# Patient Record
Sex: MALE | Race: WHITE | NOT HISPANIC OR LATINO | URBAN - METROPOLITAN AREA
[De-identification: names, ages, dates, MRNs, and addresses within clinical notes are randomized per-mention and may not be internally consistent; named-entity substitution may affect disease eponyms.]

---

## 2018-06-25 ENCOUNTER — EMERGENCY (EMERGENCY)
Facility: HOSPITAL | Age: 33
LOS: 1 days | Discharge: HOME | End: 2018-06-25
Attending: EMERGENCY MEDICINE
Payer: MEDICARE

## 2018-06-25 VITALS
WEIGHT: 223.11 LBS | HEIGHT: 70 IN | HEART RATE: 73 BPM | DIASTOLIC BLOOD PRESSURE: 78 MMHG | SYSTOLIC BLOOD PRESSURE: 134 MMHG | TEMPERATURE: 99 F | RESPIRATION RATE: 16 BRPM | OXYGEN SATURATION: 96 %

## 2018-06-25 VITALS
RESPIRATION RATE: 17 BRPM | DIASTOLIC BLOOD PRESSURE: 70 MMHG | OXYGEN SATURATION: 100 % | TEMPERATURE: 98 F | SYSTOLIC BLOOD PRESSURE: 122 MMHG | HEART RATE: 75 BPM

## 2018-06-25 PROCEDURE — 72100 X-RAY EXAM L-S SPINE 2/3 VWS: CPT | Mod: 26

## 2018-06-25 PROCEDURE — 72100 X-RAY EXAM L-S SPINE 2/3 VWS: CPT

## 2018-06-25 PROCEDURE — 99285 EMERGENCY DEPT VISIT HI MDM: CPT

## 2018-06-25 PROCEDURE — 99283 EMERGENCY DEPT VISIT LOW MDM: CPT | Mod: 25

## 2018-06-25 RX ORDER — IBUPROFEN 200 MG
1 TABLET ORAL
Qty: 20 | Refills: 0
Start: 2018-06-25 | End: 2018-06-29

## 2018-06-25 RX ORDER — IBUPROFEN 200 MG
600 TABLET ORAL ONCE
Qty: 0 | Refills: 0 | Status: COMPLETED | OUTPATIENT
Start: 2018-06-25 | End: 2018-06-25

## 2018-06-25 RX ORDER — LIDOCAINE 4 G/100G
1 CREAM TOPICAL ONCE
Qty: 0 | Refills: 0 | Status: COMPLETED | OUTPATIENT
Start: 2018-06-25 | End: 2018-06-25

## 2018-06-25 RX ORDER — CYCLOBENZAPRINE HYDROCHLORIDE 10 MG/1
10 TABLET, FILM COATED ORAL ONCE
Qty: 0 | Refills: 0 | Status: COMPLETED | OUTPATIENT
Start: 2018-06-25 | End: 2018-06-25

## 2018-06-25 RX ORDER — CYCLOBENZAPRINE HYDROCHLORIDE 10 MG/1
1 TABLET, FILM COATED ORAL
Qty: 9 | Refills: 0
Start: 2018-06-25 | End: 2018-06-27

## 2018-06-25 RX ADMIN — LIDOCAINE 1 PATCH: 4 CREAM TOPICAL at 20:44

## 2018-06-25 RX ADMIN — CYCLOBENZAPRINE HYDROCHLORIDE 10 MILLIGRAM(S): 10 TABLET, FILM COATED ORAL at 20:44

## 2018-06-25 RX ADMIN — Medication 600 MILLIGRAM(S): at 20:44

## 2018-06-25 NOTE — ED ADULT NURSE NOTE - OBJECTIVE STATEMENT
Present to ER with c/o of generalized back pain. Pt states it started at work. Denies any trauma or LOC.

## 2018-06-25 NOTE — ED PROVIDER NOTE - OBJECTIVE STATEMENT
33 y male presents with lower back pain, after twisting at home earlier this afternoon,  denies blunt trauma, denies fall,  did not take any pain medication, states he has hx of herniated discs,  had MRI done over 1 year ago.  no change in bowel and bladder habits,  PMD Dr Dykes  + smoker, no orthopedic

## 2018-06-25 NOTE — ED PROVIDER NOTE - PROGRESS NOTE DETAILS
patient resting comfortably, ls spine xray no acute abnormality, rx for flexeril and motrin sent to pharmacy,  given information for ortho Dr Pearce,  advised otc lidoderm patch , back pain patch as directed, any concerns or if condition worsens to return to ed

## 2018-06-25 NOTE — ED PROVIDER NOTE - ATTENDING CONTRIBUTION TO CARE
pt with acute on chronic back after twisting.  xray no acute fracture.  pain control, muscle relaxer, ortho f/u.  neurovascularly intact

## 2018-06-25 NOTE — ED PROVIDER NOTE - CHPI ED SYMPTOMS NEG
no bladder dysfunction/no neck tenderness/no difficulty bearing weight/no bowel dysfunction/no constipation/no numbness/no tingling/no motor function loss

## 2018-06-25 NOTE — ED ADULT TRIAGE NOTE - CHIEF COMPLAINT QUOTE
c/o lower back pain which started while at work, denies any fall, or any radiation c/o lower back pain which started while at work, denies any fall, or any radiation of pain down the legs, patient denies any numbness, weakness down the legs,

## 2019-09-25 ENCOUNTER — INPATIENT (INPATIENT)
Facility: HOSPITAL | Age: 34
LOS: 6 days | Discharge: HOME | DRG: 885 | End: 2019-10-02
Attending: PSYCHIATRY & NEUROLOGY | Admitting: PSYCHIATRY & NEUROLOGY
Payer: MEDICARE

## 2019-09-25 VITALS
DIASTOLIC BLOOD PRESSURE: 83 MMHG | WEIGHT: 220.02 LBS | SYSTOLIC BLOOD PRESSURE: 131 MMHG | RESPIRATION RATE: 16 BRPM | HEIGHT: 71 IN | OXYGEN SATURATION: 99 % | TEMPERATURE: 99 F | HEART RATE: 115 BPM

## 2019-09-25 LAB
ALBUMIN SERPL ELPH-MCNC: 3.7 G/DL — SIGNIFICANT CHANGE UP (ref 3.3–5)
ALP SERPL-CCNC: 76 U/L — SIGNIFICANT CHANGE UP (ref 30–120)
ALT FLD-CCNC: 27 U/L DA — SIGNIFICANT CHANGE UP (ref 10–60)
AMPHET UR-MCNC: NEGATIVE — SIGNIFICANT CHANGE UP
ANION GAP SERPL CALC-SCNC: 11 MMOL/L — SIGNIFICANT CHANGE UP (ref 5–17)
ANISOCYTOSIS BLD QL: SLIGHT — SIGNIFICANT CHANGE UP
APAP SERPL-MCNC: <1 UG/ML — LOW (ref 10–30)
APPEARANCE UR: CLEAR — SIGNIFICANT CHANGE UP
AST SERPL-CCNC: 25 U/L — SIGNIFICANT CHANGE UP (ref 10–40)
BACTERIA # UR AUTO: ABNORMAL
BARBITURATES UR SCN-MCNC: NEGATIVE — SIGNIFICANT CHANGE UP
BASOPHILS # BLD AUTO: 0.08 K/UL — SIGNIFICANT CHANGE UP (ref 0–0.2)
BASOPHILS NFR BLD AUTO: 0.8 % — SIGNIFICANT CHANGE UP (ref 0–2)
BENZODIAZ UR-MCNC: NEGATIVE — SIGNIFICANT CHANGE UP
BILIRUB SERPL-MCNC: 0.3 MG/DL — SIGNIFICANT CHANGE UP (ref 0.2–1.2)
BILIRUB UR-MCNC: NEGATIVE — SIGNIFICANT CHANGE UP
BUN SERPL-MCNC: 15 MG/DL — SIGNIFICANT CHANGE UP (ref 7–23)
CALCIUM SERPL-MCNC: 9.1 MG/DL — SIGNIFICANT CHANGE UP (ref 8.4–10.5)
CHLORIDE SERPL-SCNC: 103 MMOL/L — SIGNIFICANT CHANGE UP (ref 96–108)
CO2 SERPL-SCNC: 26 MMOL/L — SIGNIFICANT CHANGE UP (ref 22–31)
COCAINE METAB.OTHER UR-MCNC: NEGATIVE — SIGNIFICANT CHANGE UP
COLOR SPEC: YELLOW — SIGNIFICANT CHANGE UP
CREAT SERPL-MCNC: 1.28 MG/DL — SIGNIFICANT CHANGE UP (ref 0.5–1.3)
DIFF PNL FLD: NEGATIVE — SIGNIFICANT CHANGE UP
ELLIPTOCYTES BLD QL SMEAR: SLIGHT — SIGNIFICANT CHANGE UP
EOSINOPHIL # BLD AUTO: 0.34 K/UL — SIGNIFICANT CHANGE UP (ref 0–0.5)
EOSINOPHIL NFR BLD AUTO: 3.4 % — SIGNIFICANT CHANGE UP (ref 0–6)
EPI CELLS # UR: NEGATIVE — SIGNIFICANT CHANGE UP
ETHANOL SERPL-MCNC: <3 MG/DL — SIGNIFICANT CHANGE UP (ref 0–3)
GLUCOSE SERPL-MCNC: 100 MG/DL — HIGH (ref 70–99)
GLUCOSE UR QL: NEGATIVE MG/DL — SIGNIFICANT CHANGE UP
HCT VFR BLD CALC: 33 % — LOW (ref 39–50)
HGB BLD-MCNC: 9.3 G/DL — LOW (ref 13–17)
HYPOCHROMIA BLD QL: SLIGHT — SIGNIFICANT CHANGE UP
IMM GRANULOCYTES NFR BLD AUTO: 0.2 % — SIGNIFICANT CHANGE UP (ref 0–1.5)
KETONES UR-MCNC: ABNORMAL
LEUKOCYTE ESTERASE UR-ACNC: NEGATIVE — SIGNIFICANT CHANGE UP
LYMPHOCYTES # BLD AUTO: 2.57 K/UL — SIGNIFICANT CHANGE UP (ref 1–3.3)
LYMPHOCYTES # BLD AUTO: 25.6 % — SIGNIFICANT CHANGE UP (ref 13–44)
MANUAL SMEAR VERIFICATION: SIGNIFICANT CHANGE UP
MCHC RBC-ENTMCNC: 19.5 PG — LOW (ref 27–34)
MCHC RBC-ENTMCNC: 28.2 GM/DL — LOW (ref 32–36)
MCV RBC AUTO: 69.2 FL — LOW (ref 80–100)
METHADONE UR-MCNC: NEGATIVE — SIGNIFICANT CHANGE UP
MICROCYTES BLD QL: SLIGHT — SIGNIFICANT CHANGE UP
MONOCYTES # BLD AUTO: 0.71 K/UL — SIGNIFICANT CHANGE UP (ref 0–0.9)
MONOCYTES NFR BLD AUTO: 7.1 % — SIGNIFICANT CHANGE UP (ref 2–14)
NEUTROPHILS # BLD AUTO: 6.3 K/UL — SIGNIFICANT CHANGE UP (ref 1.8–7.4)
NEUTROPHILS NFR BLD AUTO: 62.9 % — SIGNIFICANT CHANGE UP (ref 43–77)
NITRITE UR-MCNC: NEGATIVE — SIGNIFICANT CHANGE UP
NRBC # BLD: 0 /100 WBCS — SIGNIFICANT CHANGE UP (ref 0–0)
OPIATES UR-MCNC: NEGATIVE — SIGNIFICANT CHANGE UP
PCP SPEC-MCNC: SIGNIFICANT CHANGE UP
PCP UR-MCNC: NEGATIVE — SIGNIFICANT CHANGE UP
PH UR: 5 — SIGNIFICANT CHANGE UP (ref 5–8)
PLAT MORPH BLD: NORMAL — SIGNIFICANT CHANGE UP
PLATELET # BLD AUTO: 289 K/UL — SIGNIFICANT CHANGE UP (ref 150–400)
PLATELET COUNT - ESTIMATE: NORMAL — SIGNIFICANT CHANGE UP
POIKILOCYTOSIS BLD QL AUTO: SLIGHT — SIGNIFICANT CHANGE UP
POTASSIUM SERPL-MCNC: 3.4 MMOL/L — LOW (ref 3.5–5.3)
POTASSIUM SERPL-SCNC: 3.4 MMOL/L — LOW (ref 3.5–5.3)
PROT SERPL-MCNC: 7.3 G/DL — SIGNIFICANT CHANGE UP (ref 6–8.3)
PROT UR-MCNC: 15 MG/DL
RBC # BLD: 4.77 M/UL — SIGNIFICANT CHANGE UP (ref 4.2–5.8)
RBC # FLD: 18.5 % — HIGH (ref 10.3–14.5)
RBC BLD AUTO: ABNORMAL
RBC CASTS # UR COMP ASSIST: SIGNIFICANT CHANGE UP /HPF (ref 0–4)
SALICYLATES SERPL-MCNC: 2.2 MG/DL — LOW (ref 2.8–20)
SODIUM SERPL-SCNC: 140 MMOL/L — SIGNIFICANT CHANGE UP (ref 135–145)
SP GR SPEC: 1.02 — SIGNIFICANT CHANGE UP (ref 1.01–1.02)
THC UR QL: POSITIVE
UROBILINOGEN FLD QL: 1 MG/DL
WBC # BLD: 10.02 K/UL — SIGNIFICANT CHANGE UP (ref 3.8–10.5)
WBC # FLD AUTO: 10.02 K/UL — SIGNIFICANT CHANGE UP (ref 3.8–10.5)
WBC UR QL: SIGNIFICANT CHANGE UP

## 2019-09-25 PROCEDURE — 93010 ELECTROCARDIOGRAM REPORT: CPT

## 2019-09-25 RX ADMIN — Medication 2 MILLIGRAM(S): at 22:36

## 2019-09-25 NOTE — ED PROVIDER NOTE - OBJECTIVE STATEMENT
35 y/o male with a PMHx of schizophrenia presents to the ED for psych evaluation. Pt has been hearing voices intermittently since he was a child but states it has been worsening in the past 2 months. On Abilify but states it hasn't been working much anymore. Saw his doctor and was rx'd new medication but didn't like it because it made his legs feel heavy. Denies HI, SI. Smoked some marijuana today. no EtOH use. 35 y/o male with a PMHx of schizophrenia presents to the ED for psych evaluation. Pt has been hearing voices intermittently since he was a child but states it has been worsening in the past 2 months. On Abilify but states it hasn't been working much anymore. Saw his doctor and was rx'd new medication but didn't like it because it made his legs feel heavy. Denies HI, SI. Smoked some marijuana today. no EtOH use.   states has had intermittent SI but none today.

## 2019-09-25 NOTE — ED PROVIDER NOTE - PROGRESS NOTE DETAILS
results noted.   case d/w telepsych (Dr Rodarte) and will evaluate pt repeat CBC noted.   on further questioning, pt now states has had dark stool everyday x 1 yr but never f/u.   pt denies any dizziness, sob or weakness.   guiaic done and sent to lab.  pending results.   brown stool noted. repeat CBCs stable.   pt evaluated by Dr Rodarte and recommend admit for psychosis.

## 2019-09-25 NOTE — ED ADULT TRIAGE NOTE - CHIEF COMPLAINT QUOTE
I am schizophrenia and I have been hearing voices on and off for two months so I think I should be admitted to the psych ro[facility].

## 2019-09-25 NOTE — ED ADULT NURSE NOTE - CHIEF COMPLAINT QUOTE
I am schizophrenia and I have been hearing voices on and off for two months so I think I should be admitted to the psych ro[facility].

## 2019-09-25 NOTE — ED PROVIDER NOTE - CARE PLAN
Principal Discharge DX:	Schizoaffective disorder, unspecified type  Secondary Diagnosis:	Cannabis abuse

## 2019-09-25 NOTE — ED ADULT NURSE NOTE - HPI (INCLUDE ILLNESS QUALITY, SEVERITY, DURATION, TIMING, CONTEXT, MODIFYING FACTORS, ASSOCIATED SIGNS AND SYMPTOMS)
34 yr old male walked into ED c/o hearing voices that tell him to do things and then tell him he is "no good"; states the voices wont stop and they are getting to be too much; hx schizophrenia; pt not taking Zyprexa as prescribed; pt taking Abilify and today pt cracked a piece of Zyprexa 10mg and took that; states he just wants things to be over but denies suicidal thoughts; states he attempted to commit suicide about 15 years ago by stabbing himself in head and side with butterfly knife;

## 2019-09-25 NOTE — ED ADULT NURSE NOTE - OTHER
pt states voices tell him he is not good, stupid, "I need pussy and it bothers me that I don't get any"

## 2019-09-26 DIAGNOSIS — F25.9 SCHIZOAFFECTIVE DISORDER, UNSPECIFIED: ICD-10-CM

## 2019-09-26 DIAGNOSIS — F12.10 CANNABIS ABUSE, UNCOMPLICATED: ICD-10-CM

## 2019-09-26 PROBLEM — M54.9 DORSALGIA, UNSPECIFIED: Chronic | Status: ACTIVE | Noted: 2018-06-25

## 2019-09-26 LAB
HBA1C BLD-MCNC: 5.1 % — SIGNIFICANT CHANGE UP (ref 4–5.6)
HCT VFR BLD CALC: 30.5 % — LOW (ref 39–50)
HCT VFR BLD CALC: 32 % — LOW (ref 39–50)
HGB BLD-MCNC: 8.4 G/DL — LOW (ref 13–17)
HGB BLD-MCNC: 8.8 G/DL — LOW (ref 13–17)
MCHC RBC-ENTMCNC: 19.2 PG — LOW (ref 27–34)
MCHC RBC-ENTMCNC: 19.3 PG — LOW (ref 27–34)
MCHC RBC-ENTMCNC: 27.5 GM/DL — LOW (ref 32–36)
MCHC RBC-ENTMCNC: 27.5 GM/DL — LOW (ref 32–36)
MCV RBC AUTO: 69.6 FL — LOW (ref 80–100)
MCV RBC AUTO: 70.2 FL — LOW (ref 80–100)
NRBC # BLD: 0 /100 WBCS — SIGNIFICANT CHANGE UP (ref 0–0)
NRBC # BLD: 0 /100 WBCS — SIGNIFICANT CHANGE UP (ref 0–0)
OB PNL STL: NEGATIVE — SIGNIFICANT CHANGE UP
PLATELET # BLD AUTO: 233 K/UL — SIGNIFICANT CHANGE UP (ref 150–400)
PLATELET # BLD AUTO: 240 K/UL — SIGNIFICANT CHANGE UP (ref 150–400)
RBC # BLD: 4.38 M/UL — SIGNIFICANT CHANGE UP (ref 4.2–5.8)
RBC # BLD: 4.56 M/UL — SIGNIFICANT CHANGE UP (ref 4.2–5.8)
RBC # FLD: 18.3 % — HIGH (ref 10.3–14.5)
RBC # FLD: 18.3 % — HIGH (ref 10.3–14.5)
T PALLIDUM AB TITR SER: NEGATIVE — SIGNIFICANT CHANGE UP
TSH SERPL-MCNC: 2.18 UIU/ML — SIGNIFICANT CHANGE UP (ref 0.27–4.2)
WBC # BLD: 6.78 K/UL — SIGNIFICANT CHANGE UP (ref 3.8–10.5)
WBC # BLD: 7.57 K/UL — SIGNIFICANT CHANGE UP (ref 3.8–10.5)
WBC # FLD AUTO: 6.78 K/UL — SIGNIFICANT CHANGE UP (ref 3.8–10.5)
WBC # FLD AUTO: 7.57 K/UL — SIGNIFICANT CHANGE UP (ref 3.8–10.5)

## 2019-09-26 PROCEDURE — 99231 SBSQ HOSP IP/OBS SF/LOW 25: CPT

## 2019-09-26 PROCEDURE — 70450 CT HEAD/BRAIN W/O DYE: CPT | Mod: 26

## 2019-09-26 PROCEDURE — 93010 ELECTROCARDIOGRAM REPORT: CPT

## 2019-09-26 PROCEDURE — 99285 EMERGENCY DEPT VISIT HI MDM: CPT

## 2019-09-26 RX ORDER — ARIPIPRAZOLE 15 MG/1
30 TABLET ORAL DAILY
Refills: 0 | Status: DISCONTINUED | OUTPATIENT
Start: 2019-09-27 | End: 2019-10-02

## 2019-09-26 RX ORDER — HYDROCORTISONE 1 %
1 OINTMENT (GRAM) TOPICAL DAILY
Refills: 0 | Status: DISCONTINUED | OUTPATIENT
Start: 2019-09-26 | End: 2019-10-02

## 2019-09-26 RX ORDER — NICOTINE POLACRILEX 2 MG
1 GUM BUCCAL DAILY
Refills: 0 | Status: DISCONTINUED | OUTPATIENT
Start: 2019-09-26 | End: 2019-10-02

## 2019-09-26 RX ORDER — INFLUENZA VIRUS VACCINE 15; 15; 15; 15 UG/.5ML; UG/.5ML; UG/.5ML; UG/.5ML
0.5 SUSPENSION INTRAMUSCULAR ONCE
Refills: 0 | Status: COMPLETED | OUTPATIENT
Start: 2019-09-26 | End: 2019-10-01

## 2019-09-26 RX ORDER — ARIPIPRAZOLE 15 MG/1
30 TABLET ORAL ONCE
Refills: 0 | Status: COMPLETED | OUTPATIENT
Start: 2019-09-26 | End: 2019-09-26

## 2019-09-26 RX ORDER — PANTOPRAZOLE SODIUM 20 MG/1
40 TABLET, DELAYED RELEASE ORAL DAILY
Refills: 0 | Status: DISCONTINUED | OUTPATIENT
Start: 2019-09-26 | End: 2019-10-02

## 2019-09-26 RX ADMIN — Medication 1 PATCH: at 01:44

## 2019-09-26 RX ADMIN — ARIPIPRAZOLE 30 MILLIGRAM(S): 15 TABLET ORAL at 13:08

## 2019-09-26 RX ADMIN — Medication 1 PATCH: at 12:31

## 2019-09-26 NOTE — ED BEHAVIORAL HEALTH ASSESSMENT NOTE - DETAILS
admits to suicide attempt in 2004 by "stabbing armpit and head" reports "seroquel makes me shake" father had major depression about 27 years ago reports ongoing CAH of voices "making fun of me" self presented in communication with Emergency Department attending

## 2019-09-26 NOTE — PATIENT PROFILE BEHAVIORAL HEALTH - VISION (WITH CORRECTIVE LENSES IF THE PATIENT USUALLY WEARS THEM):
Normal vision: sees adequately in most situations; can see medication labels, newsprint/Near sighted

## 2019-09-26 NOTE — ED BEHAVIORAL HEALTH ASSESSMENT NOTE - OTHER PAST PSYCHIATRIC HISTORY (INCLUDE DETAILS REGARDING ONSET, COURSE OF ILLNESS, INPATIENT/OUTPATIENT TREATMENT)
7 Prior psychiatric hospitalizations. 1 prior suicide attempt in 2004.  hx of comorbid schizophrenia and alcohol/cannabis use disorder

## 2019-09-26 NOTE — ED BEHAVIORAL HEALTH ASSESSMENT NOTE - OTHER
admits to substance use defer some scattered thought process, has difficulty explaining certain topics, appears distracted at times +paranoia chronic mental health disorder; substance abuse external

## 2019-09-26 NOTE — PROGRESS NOTE BEHAVIORAL HEALTH - OTHER
defer some scattered thought process, has difficulty explaining certain topics, appears distracted at times +paranoia

## 2019-09-26 NOTE — OCCUPATIONAL THERAPY INITIAL EVALUATION ADULT - PERTINENT HX OF CURRENT PROBLEM, REHAB EVAL
This is a 34 y.o. unemployed male who is hearing voices since he was 1 y.o. stating the last 2 months the voices have gotten worse. Pt. also believes he is being watched through satellites.  PPH: Schizophrenia

## 2019-09-26 NOTE — ED BEHAVIORAL HEALTH ASSESSMENT NOTE - DESCRIPTION
Per Triage RN, provider (verbally conveyed to primary RN Jarrod) that Pt arrived at ~ 20:47 dressed appropriately for the weather, with fair hygiene/grooming. Pt was compliant with good cooperation for entire process of wanding/search/ and gowning and was escorted to the  area with no issues. Nothing of note in pt’s belongings. RN reports patient provided both urine specimen and routine bloodwork willingly. Pt was provided with water. As per RN, pt. has not been agitated or irritable during his stay.  Pt’s eye contact is fair and his speech volume/rate/articulation have been normal and clear. Pt’s affect is slightly anxious but mostly euthymic. Pt’s thought process was linear and logical.  Pt reports to RN/provider, “I’m schizophrenic and I have been hearing voices on and off for the past two months and I think I should be admitted.  The voices are telling him he is stupid and not good. “Pt. also reports that his auditory hallucinations have told him, “ he has to lift weights with his omar” and he reports he is actually upset because, “ I need pussy and it bothers me that I don’t get any.” Pt. also states that he takes abilify and Zyprexa but he does not take Zyprexa as prescribed but as he takes it as needed and he likes to bite off little pieces. Pt. denies SI/HI and reports the AH. RN states he is A/Ox 4 and does not appear cognitively impaired. Per RN, pt. was provided with Ativan 2mg for his slight anxiety and his lab work was positive for THC. Pt has no visitors at bedside. Pt is placed on a 1:1 observation and in a private room ready for telepsychiatry evaluation. HTN, hemorrhoids lives with mother, recently quit his job

## 2019-09-26 NOTE — ED BEHAVIORAL HEALTH ASSESSMENT NOTE - HPI (INCLUDE ILLNESS QUALITY, SEVERITY, DURATION, TIMING, CONTEXT, MODIFYING FACTORS, ASSOCIATED SIGNS AND SYMPTOMS)
Mr. 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    Pt notes that for the past 2-3 months, he has been hearing voices daily which have been telling him to hurt himself.  He notes that the last time he heard voices was a few minutes prior to the interview. He notes that he feels that “people are watching me, possibly through satellites,” and that “people at work were out to get me.” He notes that he quit his job “because of the voices.”   He states 1 ½ weeks ago “I saw a person turn into a demon while I was trying to have sex” with them. He notes that there have been recent obsessions with “triangles” and feels as though they are connected to “god or demons.”  He notes a recent change in medication from ability to Zyprexa, but notes that it makes him sleepy so he has not been taking it. He describes his current mood as “depressed.” He notes sleep has been erratic, but notes he has been sleeping 6-12 hours per night. He notes no changes in appetite. He endorses current SI, but does not elaborate on plan. He notes that “the voices tell him to hurt himself” and “he wants to do it when they say it.”  He notes he has been drinking 1x per week and drinks 5-12 drinks per event. He notes that recently when he has been drinking, he has been “bashing (his) head against the wall” and states “I smacked my head into a pole in [place].” Also admits to daily cannabis use for many years.   He notes a previous suicide attempt in the context of hearing voices, feeling hopeless, and depressed mood in 2004. He is currently in outpatient treatment.  Denies current or past symptoms of zeus. Denies access to firearms. Denies legal history.     Per pt’s mother Maria Fernanda , the HPI starts this past Saturday.  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 He has the ability to take care for himself. Pt. has been hospitalized in the past for schizophrenia paranoid type but his last hospitalization was around 7 years ago. Pt. has a hx of ETOH, nicotine, and cannabis abuse. He also has a hx of suicide ideation in 2008 where he attempted to circumcise his penis. Usual tx is to see a psychiatrist, collateral did not have further information on who the doctor is. She stated he is non-compliant with his Prozac he was recently prescribed because he reported “ it makes him feel heavy.”  Pt’s decompensating trigger began this past Saturday, he began to report he was hearing voices a demon and that life is not worth it because he just goes to work and comes home. He also decided to skip his psychiatry appointment this past Saturday which is unlike him. Collateral reported to prefer a medicine change instead of admission but agrees pt. is behaving “  bizarre” and that admission could still help.

## 2019-09-26 NOTE — ED BEHAVIORAL HEALTH ASSESSMENT NOTE - SUBSTANCE ISSUES AND PLAN (INCLUDE STANDING AND PRN MEDICATION)
offer NRT; given hx of alcohol abuse/use, will order CIWA q 4 hours, vitals q 4 hours, Symptom-triggered Ativan 2mg q 4 hours for CIWA score over 8. Provide Folic Acid 1mg qd, Multivitamin with minerals qd, Thiamine 1 tablet qd x 3 days.

## 2019-09-26 NOTE — ED BEHAVIORAL HEALTH ASSESSMENT NOTE - PSYCHIATRIC ISSUES AND PLAN (INCLUDE STANDING AND PRN MEDICATION)
restart zyprexa 2.5mg PO daily; however recommend inpatient team consider PARNELL given poor med compliance

## 2019-09-26 NOTE — ED BEHAVIORAL HEALTH ASSESSMENT NOTE - SUMMARY
Mr. 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    Patient with both psychotic symptoms and substance use disorder; high risk for acute suicide given CAH and hx of suicide attempt in setting of psychosis. Underlying paranoia, sexual and Rastafarian preoccupation. Patient would benefit from inpatient admission; concern for antipsychotic non compliance, consider PARNELL on inpatient unit.

## 2019-09-26 NOTE — PATIENT PROFILE BEHAVIORAL HEALTH - TEACHING/LEARNING EDUCATIONAL LEVEL
----- Message from MONA Prather sent at 3/8/2019 11:15 AM CST -----  Reviewed DEXA scan from 2015. No acute changes. Recommend to continue with the exercise, vitamin D, and the calcium.   Also recommend to have patient consider taking Prolia whic [facility]

## 2019-09-26 NOTE — ED BEHAVIORAL HEALTH ASSESSMENT NOTE - RISK ASSESSMENT
higher imminent risk: active substance use, psychosis; CAH; paranoia  elevated chronic risk: male, psychotic disorder, substance use disorder, prior suicide attempt High Acute Suicide Risk

## 2019-09-27 DIAGNOSIS — F20.9 SCHIZOPHRENIA, UNSPECIFIED: ICD-10-CM

## 2019-09-27 LAB
CHOLEST SERPL-MCNC: 151 MG/DL — SIGNIFICANT CHANGE UP (ref 10–199)
HCT VFR BLD CALC: 36.1 % — LOW (ref 39–50)
HDLC SERPL-MCNC: 43 MG/DL — SIGNIFICANT CHANGE UP
HGB BLD-MCNC: 9.6 G/DL — LOW (ref 13–17)
IRON SATN MFR SERPL: 20 UG/DL — LOW (ref 45–165)
IRON SATN MFR SERPL: 4 % — LOW (ref 16–55)
LIPID PNL WITH DIRECT LDL SERPL: 85 MG/DL — SIGNIFICANT CHANGE UP
MCHC RBC-ENTMCNC: 19 PG — LOW (ref 27–34)
MCHC RBC-ENTMCNC: 26.6 GM/DL — LOW (ref 32–36)
MCV RBC AUTO: 71.5 FL — LOW (ref 80–100)
NRBC # BLD: 0 /100 WBCS — SIGNIFICANT CHANGE UP (ref 0–0)
PLATELET # BLD AUTO: 246 K/UL — SIGNIFICANT CHANGE UP (ref 150–400)
RBC # BLD: 5.05 M/UL — SIGNIFICANT CHANGE UP (ref 4.2–5.8)
RBC # FLD: 18.5 % — HIGH (ref 10.3–14.5)
TIBC SERPL-MCNC: 474 UG/DL — HIGH (ref 220–430)
TOTAL CHOLESTEROL/HDL RATIO MEASUREMENT: 3.5 RATIO — SIGNIFICANT CHANGE UP (ref 3.4–9.6)
TRIGL SERPL-MCNC: 114 MG/DL — SIGNIFICANT CHANGE UP (ref 10–149)
UIBC SERPL-MCNC: 454 UG/DL — HIGH (ref 110–370)
WBC # BLD: 6.1 K/UL — SIGNIFICANT CHANGE UP (ref 3.8–10.5)
WBC # FLD AUTO: 6.1 K/UL — SIGNIFICANT CHANGE UP (ref 3.8–10.5)

## 2019-09-27 PROCEDURE — 99222 1ST HOSP IP/OBS MODERATE 55: CPT

## 2019-09-27 RX ORDER — NICOTINE POLACRILEX 2 MG
1 GUM BUCCAL ONCE
Refills: 0 | Status: COMPLETED | OUTPATIENT
Start: 2019-09-27 | End: 2019-09-27

## 2019-09-27 RX ORDER — NICOTINE POLACRILEX 2 MG
2 GUM BUCCAL
Refills: 0 | Status: DISCONTINUED | OUTPATIENT
Start: 2019-09-27 | End: 2019-09-27

## 2019-09-27 RX ORDER — NICOTINE POLACRILEX 2 MG
1 GUM BUCCAL
Refills: 0 | Status: DISCONTINUED | OUTPATIENT
Start: 2019-09-27 | End: 2019-10-02

## 2019-09-27 RX ADMIN — Medication 1 EACH: at 12:52

## 2019-09-27 RX ADMIN — PANTOPRAZOLE SODIUM 40 MILLIGRAM(S): 20 TABLET, DELAYED RELEASE ORAL at 08:30

## 2019-09-27 RX ADMIN — Medication 1 EACH: at 18:59

## 2019-09-27 RX ADMIN — Medication 1 PATCH: at 12:27

## 2019-09-27 RX ADMIN — Medication 1 SUPPOSITORY(S): at 08:30

## 2019-09-27 RX ADMIN — Medication 1 PATCH: at 21:43

## 2019-09-27 RX ADMIN — ARIPIPRAZOLE 30 MILLIGRAM(S): 15 TABLET ORAL at 08:30

## 2019-09-27 NOTE — PROGRESS NOTE BEHAVIORAL HEALTH - NSBHCHARTREVIEWIMAGING_PSY_A_CORE FT
< from: CT Head No Cont (09.26.19 @ 00:15) >    PROCEDURE DATE:  09/26/2019          INTERPRETATION:    Clinical Indication: Headache.    Comparison: None    Technique: Noncontrast axial CT images of the head were acquired. Coronal   and sagittal reformats were obtained.    Findings:   The ventricles and sulci are normal in size for the patient's stated age.    No acute intracranial hemorrhage is identified.  No extra-axial fluid   collection is identified.  No mass effect or midline shift is seen.    There is no evidence of acute territorial infarct.   The visualized paranasal sinuses are clear. Large left frontal osteoma is   noted. The mastoid air cells are well aerated.  No acute osseous   abnormality is seen.       Impression: No CT evidence of acute intracranial abnormality.                  ELIS HERNADEZ M.D., ATTENDING RADIOLOGIST  This document has been electronically signed. Sep 26 2019 12:41AM
< from: CT Head No Cont (09.26.19 @ 00:15) >    PROCEDURE DATE:  09/26/2019          INTERPRETATION:    Clinical Indication: Headache.    Comparison: None    Technique: Noncontrast axial CT images of the head were acquired. Coronal   and sagittal reformats were obtained.    Findings:   The ventricles and sulci are normal in size for the patient's stated age.    No acute intracranial hemorrhage is identified.  No extra-axial fluid   collection is identified.  No mass effect or midline shift is seen.    There is no evidence of acute territorial infarct.   The visualized paranasal sinuses are clear. Large left frontal osteoma is   noted. The mastoid air cells are well aerated.  No acute osseous   abnormality is seen.       Impression: No CT evidence of acute intracranial abnormality.                  ELIS HERNADEZ M.D., ATTENDING RADIOLOGIST  This document has been electronically signed. Sep 26 2019 12:41AM

## 2019-09-27 NOTE — PROGRESS NOTE BEHAVIORAL HEALTH - NSBHFUPIPCHARTREVFT_PSY_A_CORE
· HPI (include illness quality, severity, duration, timing, context, modifying factors, associated signs and symptoms)	Mr. 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    Pt notes that for the past 2-3 months, he has been hearing voices daily which have been telling him to hurt himself.  He notes that the last time he heard voices was a few minutes prior to the interview. He notes that he feels that “people are watching me, possibly through satellites,” and that “people at work were out to get me.” He notes that he quit his job “because of the voices.”   He states 1 ½ weeks ago “I saw a person turn into a demon while I was trying to have sex” with them. He notes that there have been recent obsessions with “triangles” and feels as though they are connected to “god or demons.”  He notes a recent change in medication from ability to Zyprexa, but notes that it makes him sleepy so he has not been taking it. He describes his current mood as “depressed.” He notes sleep has been erratic, but notes he has been sleeping 6-12 hours per night. He notes no changes in appetite. He endorses current SI, but does not elaborate on plan. He notes that “the voices tell him to hurt himself” and “he wants to do it when they say it.”  He notes he has been drinking 1x per week and drinks 5-12 drinks per event. He notes that recently when he has been drinking, he has been “bashing (his) head against the wall” and states “I smacked my head into a pole in [place].” Also admits to daily cannabis use for many years.   He notes a previous suicide attempt in the context of hearing voices, feeling hopeless, and depressed mood in 2004. He is currently in outpatient treatment.  Denies current or past symptoms of zeus. Denies access to firearms. Denies legal history.     Per pt’s mother Maria Fernanda , the HPI starts this past Saturday.  At baseline pt works full time with his brother in [employer], he interacts well with both friends and family, he enjoys playing electronic games, and he was in a romantic relationship that he ended a couple of months ago. He has the ability to take care for himself. Pt. has been hospitalized in the past for schizophrenia paranoid type but his last hospitalization was around 7 years ago. Pt. has a hx of ETOH, nicotine, and cannabis abuse. He also has a hx of suicide ideation in 2008 where he attempted to circumcise his penis. Usual tx is to see a psychiatrist, collateral did not have further information on who the doctor is. She stated he is non-compliant with his Prozac he was recently prescribed because he reported “ it makes him feel heavy.”  Pt’s decompensating trigger began this past Saturday, he began to report he was hearing voices a demon and that life is not worth it because he just goes to work and comes home. He also decided to skip his psychiatry appointment this past Saturday which is unlike him. Collateral reported to prefer a medicine change instead of admission but agrees pt. is behaving “  bizarre” and that admission could still help.

## 2019-09-27 NOTE — PROGRESS NOTE BEHAVIORAL HEALTH - NSBHCHARTREVIEWLAB_PSY_A_CORE FT
8.8    6.78  )-----------( 233      ( 26 Sep 2019 04:07 )             32.0   09-25    140  |  103  |  15  ----------------------------<  100<H>  3.4<L>   |  26  |  1.28    Ca    9.1      25 Sep 2019 21:19    TPro  7.3  /  Alb  3.7  /  TBili  0.3  /  DBili  x   /  AST  25  /  ALT  27  /  AlkPhos  76  09-25
9.6    6.10  )-----------( 246      ( 27 Sep 2019 07:13 )             36.1   09-25    140  |  103  |  15  ----------------------------<  100<H>  3.4<L>   |  26  |  1.28    Ca    9.1      25 Sep 2019 21:19    TPro  7.3  /  Alb  3.7  /  TBili  0.3  /  DBili  x   /  AST  25  /  ALT  27  /  AlkPhos  76  09-25

## 2019-09-27 NOTE — SBIRT NOTE ADULT - NSSBIRTALCPASSREFTXDET_GEN_A_CORE
During interview w/ , patient stated that he previously attended outpatient mental health treatment at the [facility] and would be interested in a referral back there on discharge in order to better treat his substance use. He told the  that he stopped going to that outpatient facility because it did not fit in with his work schedule at this time but would be doable now.  to re-refer patient back to [facility]. During interview w/ , patient stated that he previously attended outpatient mental health treatment at the [facility] from March 2013 to October 2018 and would be interested in a referral back there on discharge in order to better treat his substance use. He told the  that he stopped going to that outpatient facility because it did not fit in with his work schedule at this time but would be doable now.  to re-refer patient back to [facility].

## 2019-09-27 NOTE — SBIRT NOTE ADULT - NSSBIRTDRGPASSREFTXDET_GEN_A_CORE
During interview w/ , patient stated that he previously attended outpatient mental health treatment at the [facility] and would be interested in a referral back there on discharge in order to better treat his substance use. He told the  that he stopped going to that outpatient facility because it did not fit in with his work schedule at this time but would be doable now.  to re-refer patient back to [facility]. During interview w/ , patient stated that he previously attended outpatient mental health treatment at the [facility] from March 2013 to October 2018 and would be interested in a referral back there on discharge in order to better treat his substance use. He told the  that he stopped going to that outpatient facility because it did not fit in with his work schedule at this time but would be doable now.  to re-refer patient back to [facility].

## 2019-09-27 NOTE — PROGRESS NOTE BEHAVIORAL HEALTH - NSBHFUPSTRENGTHS_PSY_A_CORE
Awareness of substance use issues/Cooperative with treatment/Has supportive interpersonal relationships with family, friends or peers

## 2019-09-27 NOTE — PROGRESS NOTE BEHAVIORAL HEALTH - OTHER
some scattered thought process, has difficulty explaining certain topics, appears distracted at times +paranoia defer

## 2019-09-28 PROCEDURE — 99232 SBSQ HOSP IP/OBS MODERATE 35: CPT

## 2019-09-28 RX ORDER — TRAZODONE HCL 50 MG
50 TABLET ORAL AT BEDTIME
Refills: 0 | Status: DISCONTINUED | OUTPATIENT
Start: 2019-09-28 | End: 2019-10-02

## 2019-09-28 RX ORDER — HALOPERIDOL DECANOATE 100 MG/ML
5 INJECTION INTRAMUSCULAR EVERY 6 HOURS
Refills: 0 | Status: DISCONTINUED | OUTPATIENT
Start: 2019-09-28 | End: 2019-10-02

## 2019-09-28 RX ORDER — DIPHENHYDRAMINE HCL 50 MG
25 CAPSULE ORAL EVERY 6 HOURS
Refills: 0 | Status: DISCONTINUED | OUTPATIENT
Start: 2019-09-28 | End: 2019-10-02

## 2019-09-28 RX ADMIN — Medication 1 PATCH: at 08:35

## 2019-09-28 RX ADMIN — PANTOPRAZOLE SODIUM 40 MILLIGRAM(S): 20 TABLET, DELAYED RELEASE ORAL at 08:36

## 2019-09-28 RX ADMIN — Medication 1 SUPPOSITORY(S): at 08:36

## 2019-09-28 RX ADMIN — Medication 1 EACH: at 18:06

## 2019-09-28 RX ADMIN — ARIPIPRAZOLE 30 MILLIGRAM(S): 15 TABLET ORAL at 08:36

## 2019-09-29 PROCEDURE — 99231 SBSQ HOSP IP/OBS SF/LOW 25: CPT

## 2019-09-29 RX ADMIN — Medication 1 PATCH: at 09:18

## 2019-09-29 RX ADMIN — Medication 1 PATCH: at 08:32

## 2019-09-29 RX ADMIN — Medication 1 PATCH: at 20:27

## 2019-09-29 RX ADMIN — Medication 1 EACH: at 18:52

## 2019-09-29 RX ADMIN — ARIPIPRAZOLE 30 MILLIGRAM(S): 15 TABLET ORAL at 08:32

## 2019-09-29 RX ADMIN — Medication 1 EACH: at 06:48

## 2019-09-30 PROCEDURE — 99231 SBSQ HOSP IP/OBS SF/LOW 25: CPT

## 2019-09-30 RX ADMIN — Medication 1 PATCH: at 09:18

## 2019-09-30 RX ADMIN — Medication 1 PATCH: at 20:08

## 2019-09-30 RX ADMIN — Medication 1 EACH: at 15:15

## 2019-09-30 RX ADMIN — Medication 1 EACH: at 20:10

## 2019-09-30 RX ADMIN — Medication 1 PATCH: at 08:05

## 2019-09-30 RX ADMIN — ARIPIPRAZOLE 30 MILLIGRAM(S): 15 TABLET ORAL at 08:05

## 2019-09-30 RX ADMIN — Medication 1 PATCH: at 08:07

## 2019-09-30 RX ADMIN — Medication 1 EACH: at 08:07

## 2019-10-01 LAB
HCT VFR BLD CALC: 35.4 % — LOW (ref 39–50)
HGB BLD-MCNC: 9.9 G/DL — LOW (ref 13–17)
MCHC RBC-ENTMCNC: 19.6 PG — LOW (ref 27–34)
MCHC RBC-ENTMCNC: 28 GM/DL — LOW (ref 32–36)
MCV RBC AUTO: 70 FL — LOW (ref 80–100)
NRBC # BLD: 0 /100 WBCS — SIGNIFICANT CHANGE UP (ref 0–0)
PLATELET # BLD AUTO: 248 K/UL — SIGNIFICANT CHANGE UP (ref 150–400)
RBC # BLD: 5.06 M/UL — SIGNIFICANT CHANGE UP (ref 4.2–5.8)
RBC # FLD: 18.1 % — HIGH (ref 10.3–14.5)
WBC # BLD: 6.03 K/UL — SIGNIFICANT CHANGE UP (ref 3.8–10.5)
WBC # FLD AUTO: 6.03 K/UL — SIGNIFICANT CHANGE UP (ref 3.8–10.5)

## 2019-10-01 PROCEDURE — 99231 SBSQ HOSP IP/OBS SF/LOW 25: CPT

## 2019-10-01 RX ORDER — FERROUS SULFATE 325(65) MG
325 TABLET ORAL DAILY
Refills: 0 | Status: DISCONTINUED | OUTPATIENT
Start: 2019-10-01 | End: 2019-10-02

## 2019-10-01 RX ORDER — SENNA PLUS 8.6 MG/1
2 TABLET ORAL AT BEDTIME
Refills: 0 | Status: DISCONTINUED | OUTPATIENT
Start: 2019-10-01 | End: 2019-10-02

## 2019-10-01 RX ADMIN — Medication 1 EACH: at 08:44

## 2019-10-01 RX ADMIN — INFLUENZA VIRUS VACCINE 0.5 MILLILITER(S): 15; 15; 15; 15 SUSPENSION INTRAMUSCULAR at 15:00

## 2019-10-01 RX ADMIN — SENNA PLUS 2 TABLET(S): 8.6 TABLET ORAL at 20:38

## 2019-10-01 RX ADMIN — Medication 1 PATCH: at 08:40

## 2019-10-01 RX ADMIN — Medication 1 EACH: at 13:02

## 2019-10-01 RX ADMIN — Medication 1 EACH: at 17:53

## 2019-10-01 RX ADMIN — ARIPIPRAZOLE 30 MILLIGRAM(S): 15 TABLET ORAL at 08:39

## 2019-10-01 RX ADMIN — Medication 325 MILLIGRAM(S): at 20:39

## 2019-10-01 RX ADMIN — Medication 1 SUPPOSITORY(S): at 08:39

## 2019-10-01 RX ADMIN — PANTOPRAZOLE SODIUM 40 MILLIGRAM(S): 20 TABLET, DELAYED RELEASE ORAL at 08:40

## 2019-10-01 RX ADMIN — Medication 1 PATCH: at 08:39

## 2019-10-01 NOTE — PROGRESS NOTE BEHAVIORAL HEALTH - DETAILS
reports ongoing CAH of voices "making fun of me"

## 2019-10-01 NOTE — DIETITIAN INITIAL EVALUATION ADULT. - OTHER INFO
35yo male admitted c hx schizoaffective disorder seen for nutrition assessment on behavior health unit per LOS policy (<7 days since admission).  Pt on regular diet throughout week c reported good appetite and intake.  Reports weight generally stable PTA.  Pt offers no nutrition related questions or concerns at time of visit.

## 2019-10-02 VITALS
SYSTOLIC BLOOD PRESSURE: 117 MMHG | OXYGEN SATURATION: 97 % | DIASTOLIC BLOOD PRESSURE: 68 MMHG | TEMPERATURE: 98 F | HEART RATE: 98 BPM | RESPIRATION RATE: 16 BRPM

## 2019-10-02 PROCEDURE — 84443 ASSAY THYROID STIM HORMONE: CPT

## 2019-10-02 PROCEDURE — 83540 ASSAY OF IRON: CPT

## 2019-10-02 PROCEDURE — 83550 IRON BINDING TEST: CPT

## 2019-10-02 PROCEDURE — 86780 TREPONEMA PALLIDUM: CPT

## 2019-10-02 PROCEDURE — 82272 OCCULT BLD FECES 1-3 TESTS: CPT

## 2019-10-02 PROCEDURE — 85027 COMPLETE CBC AUTOMATED: CPT

## 2019-10-02 PROCEDURE — 36415 COLL VENOUS BLD VENIPUNCTURE: CPT

## 2019-10-02 PROCEDURE — 70450 CT HEAD/BRAIN W/O DYE: CPT

## 2019-10-02 PROCEDURE — 83036 HEMOGLOBIN GLYCOSYLATED A1C: CPT

## 2019-10-02 PROCEDURE — 80307 DRUG TEST PRSMV CHEM ANLYZR: CPT

## 2019-10-02 PROCEDURE — 80061 LIPID PANEL: CPT

## 2019-10-02 PROCEDURE — 99285 EMERGENCY DEPT VISIT HI MDM: CPT

## 2019-10-02 PROCEDURE — 80053 COMPREHEN METABOLIC PANEL: CPT

## 2019-10-02 PROCEDURE — 99239 HOSP IP/OBS DSCHRG MGMT >30: CPT

## 2019-10-02 PROCEDURE — 90686 IIV4 VACC NO PRSV 0.5 ML IM: CPT

## 2019-10-02 PROCEDURE — 81001 URINALYSIS AUTO W/SCOPE: CPT

## 2019-10-02 PROCEDURE — 93005 ELECTROCARDIOGRAM TRACING: CPT

## 2019-10-02 RX ORDER — SENNA PLUS 8.6 MG/1
2 TABLET ORAL
Qty: 60 | Refills: 0
Start: 2019-10-02 | End: 2019-10-31

## 2019-10-02 RX ORDER — FERROUS SULFATE 325(65) MG
1 TABLET ORAL
Qty: 0 | Refills: 0 | DISCHARGE
Start: 2019-10-02

## 2019-10-02 RX ORDER — ARIPIPRAZOLE 15 MG/1
1 TABLET ORAL
Qty: 0 | Refills: 0 | DISCHARGE
Start: 2019-10-02

## 2019-10-02 RX ORDER — ESCITALOPRAM OXALATE 10 MG/1
1 TABLET, FILM COATED ORAL
Qty: 0 | Refills: 0 | DISCHARGE

## 2019-10-02 RX ORDER — HYDROCORTISONE 1 %
1 OINTMENT (GRAM) TOPICAL
Qty: 30 | Refills: 0
Start: 2019-10-02 | End: 2019-10-31

## 2019-10-02 RX ORDER — FERROUS SULFATE 325(65) MG
1 TABLET ORAL
Qty: 30 | Refills: 0
Start: 2019-10-02 | End: 2019-10-31

## 2019-10-02 RX ORDER — TRAZODONE HCL 50 MG
1 TABLET ORAL
Qty: 30 | Refills: 0
Start: 2019-10-02 | End: 2019-10-31

## 2019-10-02 RX ADMIN — PANTOPRAZOLE SODIUM 40 MILLIGRAM(S): 20 TABLET, DELAYED RELEASE ORAL at 08:32

## 2019-10-02 RX ADMIN — Medication 1 PATCH: at 08:36

## 2019-10-02 RX ADMIN — Medication 1 SUPPOSITORY(S): at 08:35

## 2019-10-02 RX ADMIN — Medication 1 EACH: at 06:59

## 2019-10-02 RX ADMIN — ARIPIPRAZOLE 30 MILLIGRAM(S): 15 TABLET ORAL at 08:34

## 2019-10-02 RX ADMIN — Medication 325 MILLIGRAM(S): at 08:34

## 2019-10-02 RX ADMIN — Medication 1 PATCH: at 08:37

## 2019-10-02 RX ADMIN — Medication 1 PATCH: at 08:35

## 2019-10-02 NOTE — DISCHARGE NOTE BEHAVIORAL HEALTH - FAMILY HISTORY OF PSYCHIATRIC ILLNESS
Single male, domiciled with mother, no children, works in TV installation and some photography. H.School graduate.

## 2019-10-02 NOTE — PROGRESS NOTE BEHAVIORAL HEALTH - PRIMARY DX
Schizophrenia, unspecified type
Schizoaffective disorder, unspecified type
Schizophrenia, unspecified type
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
## 2019-10-02 NOTE — DISCHARGE NOTE BEHAVIORAL HEALTH - NSBHDCHANDOFFFT_PSY_A_CORE
faxed clinical information to Abdifatah BOSS in the intake department of [facility] ([facility]) and also spoke with her over the phone.  was also advised by Lissett CHANG in the outreach department of [facility] that an outreach staff member would come to the unit prior to patient's discharge in order to meet with him and educate the patient about the services provided by [facility].

## 2019-10-02 NOTE — DISCHARGE NOTE BEHAVIORAL HEALTH - NSBHDCCRISISPLAN3FT_PSY_A_CORE
Utilize healthy coping skills learned on the unit like coloring, drawing, journaling, playing games, meditation, yoga, aromatherapy, beading, listening to music, talking to supports, and creating daily structure.

## 2019-10-02 NOTE — PROGRESS NOTE BEHAVIORAL HEALTH - NSBHPTASSESSDT_PSY_A_CORE
01-Oct-2019 14:58
02-Oct-2019 14:23
29-Sep-2019
26-Sep-2019 14:15
30-Sep-2019 14:13
28-Sep-2019
27-Sep-2019 14:57
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
## 2019-10-02 NOTE — PROGRESS NOTE BEHAVIORAL HEALTH - SUMMARY
Mr. 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    Patient with both psychotic symptoms and substance use disorder; high risk for acute suicide given CAH and hx of suicide attempt in setting of psychosis. Underlying paranoia, sexual and Advent preoccupation. Patient would benefit from inpatient admission; concern for antipsychotic non compliance, consider PARNELL on inpatient unit.
Mr. 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 Patient with both psychotic symptoms and substance use disorder; high risk for acute suicide given CAH and hx of suicide attempt in setting of psychosis. Underlying paranoia, sexual and Yazidi preoccupation. Patient would benefit from inpatient admission; concern for antipsychotic non compliance, consider PARNELL on inpatient unit. On Abilify 30mg PO qd with good response.
Mr. 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    Patient with both psychotic symptoms and substance use disorder; high risk for acute suicide given CAH and hx of suicide attempt in setting of psychosis. Underlying paranoia, sexual and Uatsdin preoccupation. Patient would benefit from inpatient admission; concern for antipsychotic non compliance, consider PARNELL on inpatient unit.
Mr. 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 Patient with both psychotic symptoms and substance use disorder; high risk for acute suicide given CAH and hx of suicide attempt in setting of psychosis. Underlying paranoia, sexual and Yazdanism preoccupation. Patient would benefit from inpatient admission; concern for antipsychotic non compliance, consider PARNELL on inpatient unit. On Abilify 30mg PO qd with good response.
Mr. 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    Patient with both psychotic symptoms and substance use disorder; high risk for acute suicide given CAH and hx of suicide attempt in setting of psychosis. Underlying paranoia, sexual and Restorationist preoccupation. Patient would benefit from inpatient admission; concern for antipsychotic non compliance, consider PARNELL on inpatient unit.
Mr. 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 Patient with both psychotic symptoms and substance use disorder; high risk for acute suicide given CAH and hx of suicide attempt in setting of psychosis. Underlying paranoia, sexual and Presybeterian preoccupation. Patient would benefit from inpatient admission; concern for antipsychotic non compliance, consider PARNELL on inpatient unit. On Abilify 30mg PO qd with good response.
Mr. 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 Patient with both psychotic symptoms and substance use disorder; high risk for acute suicide given CAH and hx of suicide attempt in setting of psychosis. Underlying paranoia, sexual and Scientology preoccupation. Patient would benefit from inpatient admission; concern for antipsychotic non compliance, consider PARNELL on inpatient unit. On Abilify 30mg PO qd with good response.

## 2019-10-02 NOTE — PROGRESS NOTE BEHAVIORAL HEALTH - NSBHFUPINTERVALCCFT_PSY_A_CORE
" I don't hear voices anymore "
" I'm ready to leave "
" I'm Hearing Voices, and need my medications to be adjusted "
" I feel OK "
' I'm hearing Voices and needs meds adjustment "

## 2019-10-02 NOTE — DISCHARGE NOTE BEHAVIORAL HEALTH - NSBHDCALCOHOLREFERFT_PSY_A_CORE
Intake appointment scheduled for 10 AM on Tuesday, 10/09/2019, at [facility].

## 2019-10-02 NOTE — PROGRESS NOTE BEHAVIORAL HEALTH - RISK ASSESSMENT
higher imminent risk: active substance use, psychosis; CAH; paranoia  elevated chronic risk: male, psychotic disorder, substance use disorder, prior suicide attempt

## 2019-10-02 NOTE — DISCHARGE NOTE BEHAVIORAL HEALTH - CARE PROVIDER_API CALL
[facility] and [facility],   [address] [place] [address]; [place]; [address]  10/09/2019  Phone: [phone]  Fax: (   )    -  Follow Up Time: [facility],   [address] [place] [address]  Phone: [phone]  Fax: [phone]  Follow Up Time: 1 week [facility],   [address] [place] [address]  Phone: [phone]  Fax: [phone]  Follow Up Time: 1 week    ,   For GI appointment  To call above number to F/U for low Hemoglobin  Phone: [phone]  Fax: (   )    -  Follow Up Time:
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
## 2019-10-02 NOTE — DISCHARGE NOTE BEHAVIORAL HEALTH - MEDICATION SUMMARY - MEDICATIONS TO STOP TAKING
I will STOP taking the medications listed below when I get home from the hospital:    Lexapro 10 mg oral tablet  -- 1 tab(s) by mouth once a day    cyclobenzaprine 10 mg oral tablet  -- 1 tab(s) by mouth every 8 hours -for muscle spasm   -- May cause drowsiness.  Alcohol may intensify this effect.  Use care when operating dangerous machinery.  Obtain medical advice before taking any non-prescription drugs as some may affect the action of this medication.    ibuprofen 600 mg oral tablet  -- 1 tab(s) by mouth every 6 hours - for moderate pain   -- Do not take this drug if you are pregnant.  It is very important that you take or use this exactly as directed.  Do not skip doses or discontinue unless directed by your doctor.  May cause drowsiness or dizziness.  Obtain medical advice before taking any non-prescription drugs as some may affect the action of this medication.  Take with food or milk.

## 2019-10-02 NOTE — DISCHARGE NOTE BEHAVIORAL HEALTH - MEDICATION SUMMARY - MEDICATIONS TO TAKE
I will START or STAY ON the medications listed below when I get home from the hospital:    traZODone 50 mg oral tablet  -- 1 tab(s) by mouth once a day (at bedtime) x 30 days, As Needed -insomnia   -- Indication: For Sleep    ARIPiprazole 30 mg oral tablet  -- 1 tab(s) by mouth once a day  -- Indication: For Psychosis    ferrous sulfate 324 mg (65 mg elemental iron) oral tablet  -- 1 tab(s) by mouth once a day-30 days  -- Indication: For Iron Deficiency

## 2019-10-02 NOTE — PROGRESS NOTE BEHAVIORAL HEALTH - NSBHCHARTREVIEWINVESTIGATE_PSY_A_CORE FT
< from: 12 Lead ECG (09.26.19 @ 09:54) >    Ventricular Rate 83 BPM    Atrial Rate 83 BPM    P-R Interval 184 ms    QRS Duration 90 ms    Q-T Interval 350 ms    QTC Calculation(Bezet) 411 ms    P Axis 28 degrees    R Axis 55 degrees    T Axis 35 degrees    Diagnosis Line Normal sinus rhythm  Normal ECG  No previous ECGs available  Confirmed by Reg Ruiz MD ([id number]) on 9/26/2019 3:35:41 PM

## 2019-10-02 NOTE — PROGRESS NOTE BEHAVIORAL HEALTH - NSBHADMITMEDEDUDETAILS_A_CORE FT
Discussed risks/benefits/s-e
Discussed risks./benefits/s-e
Discussed risks/benefits/s-e

## 2019-10-02 NOTE — DISCHARGE NOTE BEHAVIORAL HEALTH - PROVIDER TOKENS
FREE:[LAST:[[facility] and [facility]],PHONE:[phone]],FAX:[(   [phone]    -],ADDRESS:[[address] [place] [address]; [id number]  10/09/2019]] FREE:[LAST:[[facility]],PHONE:[phone]],FAX:[phone]],ADDRESS:[[address] [place] [address]],FOLLOWUP:[1 week]] FREE:[LAST:[[facility]],PHONE:[phone]],FAX:[phone]],ADDRESS:[[address] [place] [address]],FOLLOWUP:[1 week]],FREE:[LAST:[],PHONE:[phone]],FAX:[(   )    -],ADDRESS:[For GI appointment  To call above number to F/U for low Hemoglobin]]

## 2019-10-02 NOTE — PROGRESS NOTE BEHAVIORAL HEALTH - NSBHCHARTREVIEWVS_PSY_A_CORE FT
Vital Signs Last 24 Hrs  T(C): 36.6 (26 Sep 2019 08:36), Max: 37.1 (25 Sep 2019 20:53)  T(F): 97.8 (26 Sep 2019 08:36), Max: 98.7 (25 Sep 2019 20:53)  HR: 95 (26 Sep 2019 08:36) (69 - 115)  BP: 134/80 (26 Sep 2019 08:36) (107/67 - 134/80)  BP(mean): --  RR: 17 (26 Sep 2019 08:36) (15 - 17)  SpO2: 96% (26 Sep 2019 08:36) (95% - 99%)
Temp (F): 98.1 Degrees F  Temp (C) Temp (C): 36.7 Degrees C  Temp site Temp Site: oral    Heart Rate  Heart Rate Heart Rate (beats/min): 97 /min  Heart Rate Method: noninvasive blood pressure monitor    Noninvasive Blood Pressure  BP Systolic Systolic: 112 mm Hg  BP Diastolic Diastolic (mm Hg): 62 mm Hg  Blood Pressure - Site Site: left upper arm  Blood Pressure - Method Method: electronic    Respiratory/Pulse Oximetry  Respiration Rate (breaths/min) Respiration Rate (breaths/min): 16 /min  SpO2 (%) SpO2 (%): 97 %
Temp (F): 98.1 Degrees F  Temp (C) Temp (C): 36.7 Degrees C  Temp site Temp Site: oral    Heart Rate  Heart Rate Heart Rate (beats/min): 97 /min  Heart Rate Method: noninvasive blood pressure monitor    Noninvasive Blood Pressure  BP Systolic Systolic: 112 mm Hg  BP Diastolic Diastolic (mm Hg): 62 mm Hg  Blood Pressure - Site Site: left upper arm  Blood Pressure - Method Method: electronic    Respiratory/Pulse Oximetry  Respiration Rate (breaths/min) Respiration Rate (breaths/min): 16 /min  SpO2 (%) SpO2 (%): 97 %
Temp (F): 98.2 Degrees F  Temp (C) Temp (C): 36.8 Degrees C    Heart Rate  Heart Rate Heart Rate (beats/min): 106 /min    Noninvasive Blood Pressure  BP Systolic Systolic: 116 mm Hg  BP Diastolic Diastolic (mm Hg): 78 mm Hg  Blood Pressure - Site Site: right upper arm  Blood Pressure - Method Method: electronic    Respiratory/Pulse Oximetry  Respiration Rate (breaths/min) Respiration Rate (breaths/min): 18 /min  SpO2 (%) SpO2 (%): 98 %
Temp (F): 98.1 Degrees F  Temp (C) Temp (C): 36.7 Degrees C  Temp site Temp Site: oral    Heart Rate  Heart Rate Heart Rate (beats/min): 97 /min  Heart Rate Method: noninvasive blood pressure monitor    Noninvasive Blood Pressure  BP Systolic Systolic: 112 mm Hg  BP Diastolic Diastolic (mm Hg): 62 mm Hg  Blood Pressure - Site Site: left upper arm  Blood Pressure - Method Method: electronic    Respiratory/Pulse Oximetry  Respiration Rate (breaths/min) Respiration Rate (breaths/min): 16 /min  SpO2 (%) SpO2 (%): 97 %
Vital Signs Last 24 Hrs  T(C): --  T(F): --  HR: 85 (26 Sep 2019 16:11) (85 - 85)  BP: 109/72 (26 Sep 2019 16:11) (109/72 - 109/72)  BP(mean): --  RR: 16 (26 Sep 2019 16:11) (16 - 16)  SpO2: 98% (26 Sep 2019 16:11) (98% - 98%)
Temp (F): 98.1 Degrees F  Temp (C) Temp (C): 36.7 Degrees C  Temp site Temp Site: oral    Heart Rate  Heart Rate Heart Rate (beats/min): 97 /min  Heart Rate Method: noninvasive blood pressure monitor    Noninvasive Blood Pressure  BP Systolic Systolic: 112 mm Hg  BP Diastolic Diastolic (mm Hg): 62 mm Hg  Blood Pressure - Site Site: left upper arm  Blood Pressure - Method Method: electronic    Respiratory/Pulse Oximetry  Respiration Rate (breaths/min) Respiration Rate (breaths/min): 16 /min  SpO2 (%) SpO2 (%): 97 %

## 2019-10-02 NOTE — PROGRESS NOTE BEHAVIORAL HEALTH - PERCEPTIONS
Auditory hallucinations
No abnormalities
Auditory hallucinations
Auditory hallucinations

## 2019-10-02 NOTE — PROGRESS NOTE BEHAVIORAL HEALTH - THOUGHT PROCESS
Impaired reasoning/Other
Other/Impaired reasoning
Impaired reasoning/Other
Linear/Other
Impaired reasoning/Other

## 2019-10-02 NOTE — PROGRESS NOTE BEHAVIORAL HEALTH - NSBHADMITIPREASON_PSY_A_CORE
other reason
Danger to self; mental illness expected to respond to inpatient care
other reason
other reason

## 2019-10-02 NOTE — PROGRESS NOTE ADULT - ASSESSMENT
anemia; most likely related to hemorrhoid ; H/H improved , awaiting stool OB tests.  Continue  present treatment.  substance  abuse disorder ; continue treatment as per psych  insomnia ; improved.
IRON deficiency anemia dur to hemorrhoidal bleed; no further bleeding as per patient. he refused Anusol suppository . repeat cbc in AM.   h/o psych disorder; continue treatment as per psych.
iron deficiency anemia; start pt on iron supplement and stool softener to prevent constipation.  f/u cbc.  advised pt to f/u with your doctor post discharge.  continue with psych treatment.
iron deficiency anemia; unknown etiology. pt wishes to consult with his out side doctor. but given DR Vargas   GI office telephone and information to call and see him for consult.. continue iron supplement .  s/p insomnia.  pt medically stable.

## 2019-10-02 NOTE — DISCHARGE NOTE BEHAVIORAL HEALTH - HPI (INCLUDE ILLNESS QUALITY, SEVERITY, DURATION, TIMING, CONTEXT, MODIFYING FACTORS, ASSOCIATED SIGNS AND SYMPTOMS)
As Per ED Documentation: HPI : Mr. 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    Pt notes that for the past 2-3 months, he has been hearing voices daily which have been telling him to hurt himself.  He notes that the last time he heard voices was a few minutes prior to the interview. He notes that he feels that “people are watching me, possibly through satellites,” and that “people at work were out to get me.” He notes that he quit his job “because of the voices.”   He states 1 ½ weeks ago “I saw a person turn into a demon while I was trying to have sex” with them. He notes that there have been recent obsessions with “triangles” and feels as though they are connected to “god or demons.”  He notes a recent change in medication from ability to Zyprexa, but notes that it makes him sleepy so he has not been taking it. He describes his current mood as “depressed.” He notes sleep has been erratic, but notes he has been sleeping 6-12 hours per night. He notes no changes in appetite. He endorses current SI, but does not elaborate on plan. He notes that “the voices tell him to hurt himself” and “he wants to do it when they say it.”  He notes he has been drinking 1x per week and drinks 5-12 drinks per event. He notes that recently when he has been drinking, he has been “bashing (his) head against the wall” and states “I smacked my head into a pole in [place].” Also admits to daily cannabis use for many years.   He notes a previous suicide attempt in the context of hearing voices, feeling hopeless, and depressed mood in 2004. He is currently in outpatient treatment.  Denies current or past symptoms of zeus. Denies access to firearms. Denies legal history.     Per pt’s mother Maria Fernanda , the HPI starts this past Saturday.  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 He has the ability to take care for himself. Pt. has been hospitalized in the past for schizophrenia paranoid type but his last hospitalization was around 7 years ago. Pt. has a hx of ETOH, nicotine, and cannabis abuse. He also has a hx of suicide ideation in 2008 where he attempted to circumcise his penis. Usual tx is to see a psychiatrist, collateral did not have further information on who the doctor is. She stated he is non-compliant with his Prozac he was recently prescribed because he reported “ it makes him feel heavy.”  Pt’s decompensating trigger began this past Saturday, he began to report he was hearing voices a demon and that life is not worth it because he just goes to work and comes home. He also decided to skip his psychiatry appointment this past Saturday which is unlike him. Collateral reported to prefer a medicine change instead of admission but agrees pt. is behaving “  bizarre” and that admission could still help. As Per ED Documentation: HPI : Mr. 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    Pt notes that for the past 2-3 months, he has been hearing voices daily which have been telling him to hurt himself.  He notes that the last time he heard voices was a few minutes prior to the interview. He notes that he feels that “people are watching me, possibly through satellites,” and that “people at work were out to get me.” He notes that he quit his job “because of the voices.”   He states 1 ½ weeks ago “I saw a person turn into a demon while I was trying to have sex” with them. He notes that there have been recent obsessions with “triangles” and feels as though they are connected to “god or demons.”  He notes a recent change in medication from ability to Zyprexa, but notes that it makes him sleepy so he has not been taking it. He describes his current mood as “depressed.” He notes sleep has been erratic, but notes he has been sleeping 6-12 hours per night. He notes no changes in appetite. He endorses current SI, but does not elaborate on plan. He notes that “the voices tell him to hurt himself” and “he wants to do it when they say it.”  He notes he has been drinking 1x per week and drinks 5-12 drinks per event. He notes that recently when he has been drinking, he has been “bashing (his) head against the wall” and states “I smacked my head into a pole in [place].” Also admits to daily cannabis use for many years.   He notes a previous suicide attempt in the context of hearing voices, feeling hopeless, and depressed mood in 2004. He is currently in outpatient treatment.  Denies current or past symptoms of zeus. Denies access to firearms. Denies legal history.     Per pt’s mother Maria Fernanda , the HPI starts this past Saturday.  At baseline pt works full time with his brother in [employer], he interacts well with both friends and family, he enjoys playing electronic games, and he was in a romantic relationship that he ended a couple of months ago. He has the ability to take care for himself. Pt. has been hospitalized in the past for schizophrenia paranoid type but his last hospitalization was around 7 years ago. Pt. has a hx of ETOH, nicotine, and cannabis abuse. He also has a hx of suicide ideation in 2008 where he attempted to circumcise his penis. Usual tx is to see a psychiatrist, collateral did not have further information on who the doctor is. She stated he is non-compliant with his Prozac he was recently prescribed because he reported “ it makes him feel heavy.”  Pt’s decompensating trigger began this past Saturday, he began to report he was hearing voices a demon and that life is not worth it because he just goes to work and comes home. He also decided to skip his psychiatry appointment this past Saturday which is unlike him. Collateral reported to prefer a medicine change instead of admission but agrees pt. is behaving “  bizarre” and that admission could still help.    Patient endorsed that he is under care of DR. Fajardo, who was prescribing him Abilify 30 mg with Zyprexa 10 mg HS. He endorses that he was taking 1/2 of Zyprexa at night as it makes him feel drowsy, and tires so he stopped taking Zyprexa and it seems he was also not taking the Abilify 30 mg regularly. he later added that he was smoking Cannabis daily, unknown amount but daily use and on the weekends he was drinking Liquor 5-6 glasses. He opted that he started to hear voices and now need balancing of his meds for stability. He stopped working, as he feels he is not able to focus and concentrate. He endorsed that he was hospitalized at least 10 times, last hospitalization was more than 7 years back at [facility]. He once tried to commit suicide by stabbing his head on the right side and also the left armpit. he is denying any S/H/I/P, has fair to good sleep/appetite and has no medical issues at this time. he added that Seroquel makes him act violent, Risperdal he is not able to take.  He was started on Abilify 30 mg with Trazodone 50 mg HS for sleep. He did well, and in 2 days he was symptom free, he endorsed that he no longer hears voices, and will throw away all Cannabis stuff once he is discharged home. He denied A/V/H later, he joined unit group activities and was also not S/H since he got admitted. There were no aggression or agitation or meds induced

## 2019-10-02 NOTE — PROGRESS NOTE BEHAVIORAL HEALTH - OTHER
defer some scattered thought process, has difficulty explaining certain topics, appears distracted at times

## 2019-10-02 NOTE — PROGRESS NOTE BEHAVIORAL HEALTH - NSBHADMITIPREASONDETAILS_PSY_A_CORE FT
Routine Admission

## 2019-10-02 NOTE — PROGRESS NOTE BEHAVIORAL HEALTH - THOUGHT CONTENT
Ideas of reference/Other
Other/Ideas of reference
Other/Ideas of reference
Ideas of reference/Other
Unremarkable
Ideas of reference/Other
Other/Ideas of reference

## 2019-10-02 NOTE — DISCHARGE NOTE BEHAVIORAL HEALTH - INSTRUCTIONS
Please be advised that your intake appointment at [facility] will take approximately 2 hours because there is intake paperwork that needs to be completed.

## 2019-10-02 NOTE — PROGRESS NOTE ADULT - SUBJECTIVE AND OBJECTIVE BOX
Progress: anemia   Allergies    Seroquel (Unknown)    Intolerances    MEDICATIONS  (STANDING):  ARIPiprazole 30 milliGRAM(s) Oral daily  hydrocortisone hemorrhoidal Suppository 1 Suppository(s) Rectal daily  influenza   Vaccine 0.5 milliLiter(s) IntraMuscular once  LORazepam     Tablet 1 milliGRAM(s) Oral once  nicotine - 21 mG/24Hr(s) Patch 1 patch Transdermal daily  pantoprazole   Suspension 40 milliGRAM(s) Oral daily    MEDICATIONS  (PRN):  nicotine - Inhaler 1 Each Inhalation every 3 hours PRN Nicotine withdrawal  Vital Signs Last 24 Hrs  T(C): --  T(F): --  HR: 85 (26 Sep 2019 16:11) (85 - 85)  BP: 109/72 (26 Sep 2019 16:11) (109/72 - 109/72)  BP(mean): --  RR: 16 (26 Sep 2019 16:11) (16 - 16)  SpO2: 98% (26 Sep 2019 16:11) (98% - 98%)    PHYSICAL EXAM:  GENERAL: NAD, well-groomed, well-develope  NECK: Supple, No JVD, Normal thyroid  NERVOUS SYSTEM:  Alert & Oriented X3, Good concentration; Motor Strength 5/5 B/L upper and lower extremities; DTRs 2+ intact and symmetric  CHEST/LUNG: Clear to auscultation bilaterally; No rales, rhonchi, wheezing, or rubs  HEART: Regular rate and rhythm; No murmurs, rubs, or gallops  ABDOMEN: Soft, Nontender, Nondistended; Bowel sounds present  EXTREMITIES:  2+ Peripheral Pulses, No clubbing, cyanosis, or edema  SKIN: No rashes or lesions    LABS:                        9.6    6.10  )-----------( 246      ( 27 Sep 2019 07:13 )             36.1     09-25    140  |  103  |  15  ----------------------------<  100<H>  3.4<L>   |  26  |  1.28    Ca    9.1      25 Sep 2019 21:19    TPro  7.3  /  Alb  3.7  /  TBili  0.3  /  DBili  x   /  AST  25  /  ALT  27  /  AlkPhos  76  09-25  Thyroid Stimulating Hormone, Serum: 2.18 uIU/mL (09-26-19 @ 15:11)    09-27 Chol 151 LDL 85 HDL 43 Trig 114  Hemoglobin A1C Hemoglobin A1C, Whole Blood: 5.1 % (09-26-19 @ 14:51)      RADIOLOGY & ADDITIONAL TESTS:    EKG ; NSR , no acute st-t changes.
Progress: anemia  Allergies    Seroquel (Unknown)    Intolerances    MEDICATIONS  (STANDING):  ARIPiprazole 30 milliGRAM(s) Oral daily  ferrous    sulfate 325 milliGRAM(s) Oral daily  hydrocortisone hemorrhoidal Suppository 1 Suppository(s) Rectal daily  LORazepam     Tablet 1 milliGRAM(s) Oral once  nicotine - 21 mG/24Hr(s) Patch 1 patch Transdermal daily  pantoprazole   Suspension 40 milliGRAM(s) Oral daily  senna 2 Tablet(s) Oral at bedtime    MEDICATIONS  (PRN):  diphenhydrAMINE 25 milliGRAM(s) Oral every 6 hours PRN Extrapyramidal prophylaxis  haloperidol     Tablet 5 milliGRAM(s) Oral every 6 hours PRN agitation  haloperidol    Injectable 5 milliGRAM(s) IntraMuscular every 6 hours PRN severe agitation  LORazepam     Tablet 1 milliGRAM(s) Oral every 6 hours PRN Anxiety  nicotine - Inhaler 1 Each Inhalation every 3 hours PRN Nicotine withdrawal  traZODone 50 milliGRAM(s) Oral at bedtime PRN insomnia            Vital Signs Last 24 Hrs  T(C): 36.7 (01 Oct 2019 07:30), Max: 36.7 (01 Oct 2019 07:30)  T(F): 98 (01 Oct 2019 07:30), Max: 98 (01 Oct 2019 07:30)  HR: 111 (01 Oct 2019 15:31) (88 - 111)  BP: 132/70 (01 Oct 2019 15:31) (113/77 - 132/70)  BP(mean): --  RR: 17 (01 Oct 2019 15:31) (17 - 17)  SpO2: 98% (01 Oct 2019 15:31) (95% - 98%)    PHYSICAL EXAM:  GENERAL: NAD, well-groomed, well-developed  CHEST/LUNG: Clear to auscultation bilaterally; No rales, rhonchi, wheezing, or rubs  HEART: Regular rate and rhythm; No murmurs, rubs, or gallops  ABDOMEN: Soft, Nontender, Nondistended; Bowel sounds present  EXTREMITIES:  2+ Peripheral Pulses, No clubbing, cyanosis, or edema  LYMPH: No lymphadenopathy noted      LABS:                        9.9    6.03  )-----------( 248      ( 01 Oct 2019 07:24 )             35.4     Thyroid Stimulating Hormone, Serum: 2.18 uIU/mL (09-26-19 @ 15:11)    09-27 Chol 151 LDL 85 HDL 43 Trig 114  Hemoglobin A1C
Progress: anemia.  Allergies    Seroquel (Unknown)    Intolerances  MEDICATIONS  (STANDING):  ARIPiprazole 30 milliGRAM(s) Oral daily  hydrocortisone hemorrhoidal Suppository 1 Suppository(s) Rectal daily  influenza   Vaccine 0.5 milliLiter(s) IntraMuscular once  LORazepam     Tablet 1 milliGRAM(s) Oral once  nicotine - 21 mG/24Hr(s) Patch 1 patch Transdermal daily  pantoprazole   Suspension 40 milliGRAM(s) Oral daily    MEDICATIONS  (PRN):  diphenhydrAMINE 25 milliGRAM(s) Oral every 6 hours PRN Extrapyramidal prophylaxis  haloperidol     Tablet 5 milliGRAM(s) Oral every 6 hours PRN agitation  haloperidol    Injectable 5 milliGRAM(s) IntraMuscular every 6 hours PRN severe agitation  nicotine - Inhaler 1 Each Inhalation every 3 hours PRN Nicotine withdrawal  traZODone 50 milliGRAM(s) Oral at bedtime PRN insomnia    Vital Signs Last 24 Hrs  T(C): 36.5 (30 Sep 2019 08:41), Max: 36.5 (30 Sep 2019 08:41)  T(F): 97.7 (30 Sep 2019 08:41), Max: 97.7 (30 Sep 2019 08:41)  HR: 88 (30 Sep 2019 08:41) (88 - 109)  BP: 113/73 (30 Sep 2019 08:41) (113/73 - 130/80)  BP(mean): --  RR: 18 (30 Sep 2019 08:41) (18 - 18)  SpO2: 97% (30 Sep 2019 08:41) (97% - 97%)    PHYSICAL EXAM:  GENERAL: NAD, well-groomed, well-developed  CHEST/LUNG: Clear to auscultation bilaterally; No rales, rhonchi, wheezing, or rubs  HEART: Regular rate and rhythm; No murmurs, rubs, or gallops  ABDOMEN: Soft, Nontender, Nondistended; Bowel sounds present  EXTREMITIES:  2+ Peripheral Pulses, No clubbing, cyanosis, or edema    LABS:  Thyroid Stimulating Hormone, Serum: 2.18 uIU/mL (09-26-19 @ 15:11)    09-27 Chol 151 LDL 85 HDL 43 Trig 114    serum iron ;20  sat 4  tibc ;474
Progress: persistent anemia.     Allergies    Seroquel (Unknown)    Intolerances  MEDICATIONS  (STANDING):  ARIPiprazole 30 milliGRAM(s) Oral daily  ferrous    sulfate 325 milliGRAM(s) Oral daily  hydrocortisone hemorrhoidal Suppository 1 Suppository(s) Rectal daily  LORazepam     Tablet 1 milliGRAM(s) Oral once  nicotine - 21 mG/24Hr(s) Patch 1 patch Transdermal daily  pantoprazole   Suspension 40 milliGRAM(s) Oral daily  senna 2 Tablet(s) Oral at bedtime    MEDICATIONS  (PRN):  diphenhydrAMINE 25 milliGRAM(s) Oral every 6 hours PRN Extrapyramidal prophylaxis  haloperidol     Tablet 5 milliGRAM(s) Oral every 6 hours PRN agitation  haloperidol    Injectable 5 milliGRAM(s) IntraMuscular every 6 hours PRN severe agitation  LORazepam     Tablet 1 milliGRAM(s) Oral every 6 hours PRN Anxiety  nicotine - Inhaler 1 Each Inhalation every 3 hours PRN Nicotine withdrawal  traZODone 50 milliGRAM(s) Oral at bedtime PRN insomnia      Vital Signs Last 24 Hrs  T(C): 36.8 (02 Oct 2019 08:15), Max: 36.8 (02 Oct 2019 08:15)  T(F): 98.2 (02 Oct 2019 08:15), Max: 98.2 (02 Oct 2019 08:15)  HR: 98 (02 Oct 2019 08:15) (98 - 111)  BP: 117/68 (02 Oct 2019 08:15) (117/68 - 132/70)  BP(mean): --  RR: 16 (02 Oct 2019 08:15) (16 - 17)  SpO2: 97% (02 Oct 2019 08:15) (97% - 98%)    PHYSICAL EXAM:  GENERAL: NAD, well-groomed, well-developed  HEAD:  Atraumatic, Normocephalic  EYES: EOMI, PERRLA, conjunctiva and sclera clear  CHEST/LUNG: Clear to auscultation bilaterally; No rales, rhonchi, wheezing, or rubs  HEART: Regular rate and rhythm; No murmurs, rubs, or gallops  ABDOMEN: Soft, Nontender, Nondistended; Bowel sounds present  EXTREMITIES:  2+ Peripheral Pulses, No clubbing, cyanosis, or edema      LABS:                        9.9    6.03  )-----------( 248      ( 01 Oct 2019 07:24 )             35.4     Thyroid Stimulating Hormone, Serum: 2.18 uIU/mL (09-26-19 @ 15:11)    09-27 Chol 151 LDL 85 HDL 43 Trig 114

## 2019-10-14 ENCOUNTER — TRANSCRIPTION ENCOUNTER (OUTPATIENT)
Age: 34
End: 2019-10-14

## 2019-12-21 ENCOUNTER — OUTPATIENT (OUTPATIENT)
Dept: OUTPATIENT SERVICES | Facility: HOSPITAL | Age: 34
LOS: 1 days | End: 2019-12-21
Payer: MEDICARE

## 2019-12-21 VITALS
TEMPERATURE: 98 F | OXYGEN SATURATION: 100 % | HEART RATE: 72 BPM | RESPIRATION RATE: 14 BRPM | DIASTOLIC BLOOD PRESSURE: 74 MMHG | SYSTOLIC BLOOD PRESSURE: 124 MMHG

## 2019-12-21 VITALS
HEART RATE: 77 BPM | TEMPERATURE: 98 F | SYSTOLIC BLOOD PRESSURE: 127 MMHG | DIASTOLIC BLOOD PRESSURE: 71 MMHG | RESPIRATION RATE: 14 BRPM | HEIGHT: 71 IN | OXYGEN SATURATION: 100 % | WEIGHT: 220.02 LBS

## 2019-12-21 DIAGNOSIS — D50.8 OTHER IRON DEFICIENCY ANEMIAS: ICD-10-CM

## 2019-12-21 LAB
BLD GP AB SCN SERPL QL: SIGNIFICANT CHANGE UP
HCT VFR BLD CALC: 22.7 % — LOW (ref 39–50)
HGB BLD-MCNC: 6.3 G/DL — CRITICAL LOW (ref 13–17)
MCHC RBC-ENTMCNC: 19.2 PG — LOW (ref 27–34)
MCHC RBC-ENTMCNC: 27.8 GM/DL — LOW (ref 32–36)
MCV RBC AUTO: 69.2 FL — LOW (ref 80–100)
NRBC # BLD: 0 /100 WBCS — SIGNIFICANT CHANGE UP (ref 0–0)
PLATELET # BLD AUTO: 242 K/UL — SIGNIFICANT CHANGE UP (ref 150–400)
RBC # BLD: 3.28 M/UL — LOW (ref 4.2–5.8)
RBC # FLD: 16.5 % — HIGH (ref 10.3–14.5)
WBC # BLD: 4.61 K/UL — SIGNIFICANT CHANGE UP (ref 3.8–10.5)
WBC # FLD AUTO: 4.61 K/UL — SIGNIFICANT CHANGE UP (ref 3.8–10.5)

## 2019-12-21 PROCEDURE — 86923 COMPATIBILITY TEST ELECTRIC: CPT

## 2019-12-21 PROCEDURE — 36430 TRANSFUSION BLD/BLD COMPNT: CPT

## 2019-12-21 PROCEDURE — 86850 RBC ANTIBODY SCREEN: CPT

## 2019-12-21 PROCEDURE — P9016: CPT

## 2019-12-21 PROCEDURE — 86900 BLOOD TYPING SEROLOGIC ABO: CPT

## 2019-12-21 PROCEDURE — 85027 COMPLETE CBC AUTOMATED: CPT

## 2019-12-21 PROCEDURE — 86901 BLOOD TYPING SEROLOGIC RH(D): CPT

## 2019-12-21 RX ORDER — DIPHENHYDRAMINE HCL 50 MG
25 CAPSULE ORAL ONCE
Refills: 0 | Status: COMPLETED | OUTPATIENT
Start: 2019-12-21 | End: 2019-12-21

## 2019-12-21 RX ORDER — ACETAMINOPHEN 500 MG
650 TABLET ORAL ONCE
Refills: 0 | Status: COMPLETED | OUTPATIENT
Start: 2019-12-21 | End: 2019-12-21

## 2019-12-21 RX ADMIN — Medication 25 MILLIGRAM(S): at 09:33

## 2019-12-21 RX ADMIN — Medication 650 MILLIGRAM(S): at 09:33

## 2020-07-22 ENCOUNTER — EMERGENCY (EMERGENCY)
Facility: HOSPITAL | Age: 35
LOS: 1 days | Discharge: HOME | End: 2020-07-22
Attending: EMERGENCY MEDICINE | Admitting: INTERNAL MEDICINE
Payer: MEDICARE

## 2020-07-22 VITALS
HEART RATE: 72 BPM | OXYGEN SATURATION: 100 % | RESPIRATION RATE: 18 BRPM | DIASTOLIC BLOOD PRESSURE: 65 MMHG | HEIGHT: 70 IN | WEIGHT: 205.91 LBS | SYSTOLIC BLOOD PRESSURE: 108 MMHG | TEMPERATURE: 99 F

## 2020-07-22 LAB
ANION GAP SERPL CALC-SCNC: 8 MMOL/L — SIGNIFICANT CHANGE UP (ref 5–17)
BUN SERPL-MCNC: 14 MG/DL — SIGNIFICANT CHANGE UP (ref 7–23)
CALCIUM SERPL-MCNC: 8.3 MG/DL — LOW (ref 8.5–10.1)
CHLORIDE SERPL-SCNC: 108 MMOL/L — SIGNIFICANT CHANGE UP (ref 96–108)
CO2 SERPL-SCNC: 24 MMOL/L — SIGNIFICANT CHANGE UP (ref 22–31)
CREAT SERPL-MCNC: 0.86 MG/DL — SIGNIFICANT CHANGE UP (ref 0.5–1.3)
GLUCOSE SERPL-MCNC: 80 MG/DL — SIGNIFICANT CHANGE UP (ref 70–99)
HCT VFR BLD CALC: 21.7 % — LOW (ref 39–50)
HGB BLD-MCNC: 6.4 G/DL — CRITICAL LOW (ref 13–17)
MCHC RBC-ENTMCNC: 21.3 PG — LOW (ref 27–34)
MCHC RBC-ENTMCNC: 29.5 GM/DL — LOW (ref 32–36)
MCV RBC AUTO: 72.1 FL — LOW (ref 80–100)
NRBC # BLD: 0 /100 WBCS — SIGNIFICANT CHANGE UP (ref 0–0)
PLATELET # BLD AUTO: 269 K/UL — SIGNIFICANT CHANGE UP (ref 150–400)
POTASSIUM SERPL-MCNC: 3.5 MMOL/L — SIGNIFICANT CHANGE UP (ref 3.5–5.3)
POTASSIUM SERPL-SCNC: 3.5 MMOL/L — SIGNIFICANT CHANGE UP (ref 3.5–5.3)
RBC # BLD: 3 M/UL — LOW (ref 4.2–5.8)
RBC # BLD: 3.01 M/UL — LOW (ref 4.2–5.8)
RBC # FLD: 17.2 % — HIGH (ref 10.3–14.5)
RETICS #: 60.9 K/UL — SIGNIFICANT CHANGE UP (ref 25–125)
RETICS/RBC NFR: 2 % — SIGNIFICANT CHANGE UP (ref 0.5–2.5)
SODIUM SERPL-SCNC: 140 MMOL/L — SIGNIFICANT CHANGE UP (ref 135–145)
WBC # BLD: 6.47 K/UL — SIGNIFICANT CHANGE UP (ref 3.8–10.5)
WBC # FLD AUTO: 6.47 K/UL — SIGNIFICANT CHANGE UP (ref 3.8–10.5)

## 2020-07-22 PROCEDURE — 93010 ELECTROCARDIOGRAM REPORT: CPT

## 2020-07-22 PROCEDURE — 99284 EMERGENCY DEPT VISIT MOD MDM: CPT

## 2020-07-22 RX ORDER — BUPROPION HYDROCHLORIDE 150 MG/1
1 TABLET, EXTENDED RELEASE ORAL
Qty: 0 | Refills: 0 | DISCHARGE

## 2020-07-22 RX ORDER — OMEPRAZOLE 10 MG/1
1 CAPSULE, DELAYED RELEASE ORAL
Qty: 0 | Refills: 0 | DISCHARGE

## 2020-07-22 RX ORDER — CLARITHROMYCIN 500 MG
1 TABLET ORAL
Qty: 0 | Refills: 0 | DISCHARGE

## 2020-07-22 RX ORDER — AMOXICILLIN 250 MG/5ML
1 SUSPENSION, RECONSTITUTED, ORAL (ML) ORAL
Qty: 0 | Refills: 0 | DISCHARGE

## 2020-07-22 RX ORDER — ARIPIPRAZOLE 15 MG/1
0 TABLET ORAL
Qty: 0 | Refills: 0 | DISCHARGE

## 2020-07-22 RX ORDER — FERROUS SULFATE 325(65) MG
1 TABLET ORAL
Qty: 0 | Refills: 0 | DISCHARGE

## 2020-07-22 NOTE — ED PROVIDER NOTE - OBJECTIVE STATEMENT
36 yo white male with H/O Anemia and Schizophrenia, received PRBC and Iron infusions in past now presenting with complaints of mild SOB, headache and lightheadedness while at work today. No vertigo, chest pain, palpitations, fever or chills. Feels well at this time and is presently asymptomatic. 34 yo white male with H/O Anemia and Schizophrenia, received PRBC and Iron infusions in past now presenting with complaints of mild SOB, headache and lightheadedness while at work today. No vertigo, chest pain, palpitations, fever or chills. Feels well at this time and is presently asymptomatic. Of note, patient states that he has had an extensive GI workup for blood loss being the potential cause for his anemia and per patient, results did not reveal any abnormalities.

## 2020-07-22 NOTE — ED PROVIDER NOTE - PATIENT PORTAL LINK FT
You can access the FollowMyHealth Patient Portal offered by [facility] by registering at the following website: [email or web address]. By joining [employer]’s FollowMyHealth portal, you will also be able to view your health information using other applications (apps) compatible with our system.

## 2020-07-22 NOTE — ED ADULT NURSE REASSESSMENT NOTE - NS ED NURSE REASSESS COMMENT FT1
Pt received from AYDEN Quinones. A+O x 4 from home. Reports dizziness. Reports bright red rectal bleeding x 1 month, but has an appt with GI doctor on monday 7/27/2020. skin slightly pale but cap refill < 2 secs bilaterally. Denies headache, fevers, sick contacts, travel, CP, sob, back pain, abdominal pain, NVD. VSS. skin warm dry and intact. Awaiting discharge after PRBC's # 2. 1st unit of PRBC's in progress without incident. will continue to monitor.

## 2020-07-22 NOTE — ED ADULT NURSE REASSESSMENT NOTE - NSIMPLEMENTINTERV_GEN_ALL_ED
Implemented All Fall with Harm Risk Interventions:  [place] to call system. Call bell, personal items and telephone within reach. Instruct patient to call for assistance. Room bathroom lighting operational. Non-slip footwear when patient is off stretcher. Physically safe environment: no spills, clutter or unnecessary equipment. Stretcher in lowest position, wheels locked, appropriate side rails in place. Provide visual cue, wrist band, yellow gown, etc. Monitor gait and stability. Monitor for mental status changes and reorient to person, place, and time. Review medications for side effects contributing to fall risk. Reinforce activity limits and safety measures with patient and family. Provide visual clues: red socks.

## 2020-07-23 VITALS
TEMPERATURE: 98 F | HEART RATE: 67 BPM | RESPIRATION RATE: 15 BRPM | OXYGEN SATURATION: 99 % | SYSTOLIC BLOOD PRESSURE: 126 MMHG | DIASTOLIC BLOOD PRESSURE: 77 MMHG

## 2020-07-23 LAB
FERRITIN SERPL-MCNC: 3 NG/ML — LOW (ref 30–400)
IRON SATN MFR SERPL: 12 UG/DL — LOW (ref 45–165)
IRON SATN MFR SERPL: 3 % — LOW (ref 16–55)
TIBC SERPL-MCNC: 459 UG/DL — HIGH (ref 220–430)
UIBC SERPL-MCNC: 446 UG/DL — HIGH (ref 110–370)

## 2020-07-23 PROCEDURE — 86923 COMPATIBILITY TEST ELECTRIC: CPT

## 2020-07-23 PROCEDURE — 93005 ELECTROCARDIOGRAM TRACING: CPT

## 2020-07-23 PROCEDURE — 85027 COMPLETE CBC AUTOMATED: CPT

## 2020-07-23 PROCEDURE — 80048 BASIC METABOLIC PNL TOTAL CA: CPT

## 2020-07-23 PROCEDURE — 86900 BLOOD TYPING SEROLOGIC ABO: CPT

## 2020-07-23 PROCEDURE — 82728 ASSAY OF FERRITIN: CPT

## 2020-07-23 PROCEDURE — 99285 EMERGENCY DEPT VISIT HI MDM: CPT | Mod: 25

## 2020-07-23 PROCEDURE — 86901 BLOOD TYPING SEROLOGIC RH(D): CPT

## 2020-07-23 PROCEDURE — 86850 RBC ANTIBODY SCREEN: CPT

## 2020-07-23 PROCEDURE — 83540 ASSAY OF IRON: CPT

## 2020-07-23 PROCEDURE — P9016: CPT

## 2020-07-23 PROCEDURE — 36415 COLL VENOUS BLD VENIPUNCTURE: CPT

## 2020-07-23 PROCEDURE — 83550 IRON BINDING TEST: CPT

## 2020-07-23 PROCEDURE — 85045 AUTOMATED RETICULOCYTE COUNT: CPT

## 2020-07-23 PROCEDURE — 36430 TRANSFUSION BLD/BLD COMPNT: CPT

## 2020-07-23 NOTE — ED POST DISCHARGE NOTE - DETAILS
no answer on pt phone. lvm with mother francyivan 7/23/20 # [phone] wrong number 7/24 patient called back and results given, pt will come by ED and  copy of results for f/u appt today with hematologist

## 2020-09-16 ENCOUNTER — EMERGENCY (EMERGENCY)
Facility: HOSPITAL | Age: 35
LOS: 1 days | Discharge: HOME | End: 2020-09-16
Attending: EMERGENCY MEDICINE | Admitting: EMERGENCY MEDICINE
Payer: MEDICARE

## 2020-09-16 VITALS
OXYGEN SATURATION: 98 % | WEIGHT: 210.1 LBS | HEART RATE: 85 BPM | SYSTOLIC BLOOD PRESSURE: 117 MMHG | DIASTOLIC BLOOD PRESSURE: 70 MMHG | TEMPERATURE: 98 F | HEIGHT: 70 IN | RESPIRATION RATE: 18 BRPM

## 2020-09-16 PROCEDURE — 99284 EMERGENCY DEPT VISIT MOD MDM: CPT

## 2020-09-16 NOTE — ED ADULT TRIAGE NOTE - CHIEF COMPLAINT QUOTE
states he feels lightheaded and needs to get his blood taken. reports he received a blood transfusion two months ago for bleeding when going to the bathroom. pt reports that bleeding when going to the bathroom has been going on intermittently for a year.

## 2020-09-16 NOTE — ED PROVIDER NOTE - OBJECTIVE STATEMENT
34 yo male hx of anemia, chronic rectal bleeding (had negative endoscopy/colonoscopy GI workup 6 months ago) c/o dizziness, palpitations, lightheadedness.  Feels similar to when he was anemic back in July.  No chest pain, no sob. Has f/u with his hematologist Dr. Cifuentes tomorrow    pmd christina adair 34 yo male hx of anemia, chronic rectal bleeding (had negative endoscopy/colonoscopy GI workup 6 months ago) c/o dizziness, palpitations, lightheadedness.  Feels similar to when he was anemic back in July.  No chest pain, no sob. Has f/u with his hematologist Dr. Cifuentes tomorrow.  States he had hbg 11 a month ago.    pmd christina adair

## 2020-09-16 NOTE — ED PROVIDER NOTE - NSFOLLOWUPINSTRUCTIONS_ED_ALL_ED_FT
1) Follow-up with Dr. Davila. Call today / next business day for prompt follow-up.  2) Return to Emergency room for any worsening or persistent pain, weakness, fever, or any other concerning symptoms.  3) See attached instruction sheets for additional information, including information regarding signs and symptoms to look out for, reasons to seek immediate care and other important instructions.  4) Follow-up with any specialists as discussed / noted as well.

## 2020-09-16 NOTE — ED ADULT TRIAGE NOTE - TEMPERATURE IN CELSIUS (DEGREES C)
Patient seen per [facility] standard of care. No skin concerns noted at this time.   36.6

## 2020-09-16 NOTE — ED PROVIDER NOTE - PATIENT PORTAL LINK FT
You can access the FollowMyHealth Patient Portal offered by [facility] by registering at the following website: [email or web address]. By joining [employer]’s FollowMyHealth portal, you will also be able to view your health information using other applications (apps) compatible with our system.

## 2020-09-16 NOTE — ED PROVIDER NOTE - CARE PROVIDER_API CALL
Laura Davila  [facility]  [address], [address] [place] [address]  Phone: [phone]  Fax: [phone]  Follow Up Time:

## 2020-09-17 VITALS
TEMPERATURE: 98 F | RESPIRATION RATE: 18 BRPM | HEART RATE: 69 BPM | SYSTOLIC BLOOD PRESSURE: 109 MMHG | OXYGEN SATURATION: 100 % | DIASTOLIC BLOOD PRESSURE: 56 MMHG

## 2020-09-17 LAB
ALBUMIN SERPL ELPH-MCNC: 3.3 G/DL — SIGNIFICANT CHANGE UP (ref 3.3–5)
ALP SERPL-CCNC: 58 U/L — SIGNIFICANT CHANGE UP (ref 40–120)
ALT FLD-CCNC: 24 U/L — SIGNIFICANT CHANGE UP (ref 12–78)
ANION GAP SERPL CALC-SCNC: 7 MMOL/L — SIGNIFICANT CHANGE UP (ref 5–17)
APTT BLD: 26.4 SEC — LOW (ref 27.5–35.5)
AST SERPL-CCNC: 16 U/L — SIGNIFICANT CHANGE UP (ref 15–37)
BASOPHILS # BLD AUTO: 0.05 K/UL — SIGNIFICANT CHANGE UP (ref 0–0.2)
BASOPHILS NFR BLD AUTO: 0.9 % — SIGNIFICANT CHANGE UP (ref 0–2)
BILIRUB SERPL-MCNC: 0.2 MG/DL — SIGNIFICANT CHANGE UP (ref 0.2–1.2)
BUN SERPL-MCNC: 18 MG/DL — SIGNIFICANT CHANGE UP (ref 7–23)
CALCIUM SERPL-MCNC: 8.5 MG/DL — SIGNIFICANT CHANGE UP (ref 8.5–10.1)
CHLORIDE SERPL-SCNC: 111 MMOL/L — HIGH (ref 96–108)
CO2 SERPL-SCNC: 25 MMOL/L — SIGNIFICANT CHANGE UP (ref 22–31)
CREAT SERPL-MCNC: 0.86 MG/DL — SIGNIFICANT CHANGE UP (ref 0.5–1.3)
EOSINOPHIL # BLD AUTO: 0.29 K/UL — SIGNIFICANT CHANGE UP (ref 0–0.5)
EOSINOPHIL NFR BLD AUTO: 5.5 % — SIGNIFICANT CHANGE UP (ref 0–6)
GLUCOSE SERPL-MCNC: 87 MG/DL — SIGNIFICANT CHANGE UP (ref 70–99)
HCT VFR BLD CALC: 27.1 % — LOW (ref 39–50)
HGB BLD-MCNC: 8.3 G/DL — LOW (ref 13–17)
IMM GRANULOCYTES NFR BLD AUTO: 0.2 % — SIGNIFICANT CHANGE UP (ref 0–1.5)
INR BLD: 1 RATIO — SIGNIFICANT CHANGE UP (ref 0.88–1.16)
LYMPHOCYTES # BLD AUTO: 1.71 K/UL — SIGNIFICANT CHANGE UP (ref 1–3.3)
LYMPHOCYTES # BLD AUTO: 32.2 % — SIGNIFICANT CHANGE UP (ref 13–44)
MCHC RBC-ENTMCNC: 25 PG — LOW (ref 27–34)
MCHC RBC-ENTMCNC: 30.6 GM/DL — LOW (ref 32–36)
MCV RBC AUTO: 81.6 FL — SIGNIFICANT CHANGE UP (ref 80–100)
MONOCYTES # BLD AUTO: 0.51 K/UL — SIGNIFICANT CHANGE UP (ref 0–0.9)
MONOCYTES NFR BLD AUTO: 9.6 % — SIGNIFICANT CHANGE UP (ref 2–14)
NEUTROPHILS # BLD AUTO: 2.74 K/UL — SIGNIFICANT CHANGE UP (ref 1.8–7.4)
NEUTROPHILS NFR BLD AUTO: 51.6 % — SIGNIFICANT CHANGE UP (ref 43–77)
NRBC # BLD: 0 /100 WBCS — SIGNIFICANT CHANGE UP (ref 0–0)
PLATELET # BLD AUTO: 238 K/UL — SIGNIFICANT CHANGE UP (ref 150–400)
POTASSIUM SERPL-MCNC: 4 MMOL/L — SIGNIFICANT CHANGE UP (ref 3.5–5.3)
POTASSIUM SERPL-SCNC: 4 MMOL/L — SIGNIFICANT CHANGE UP (ref 3.5–5.3)
PROT SERPL-MCNC: 5.9 G/DL — LOW (ref 6–8.3)
PROTHROM AB SERPL-ACNC: 11.7 SEC — SIGNIFICANT CHANGE UP (ref 10.6–13.6)
RBC # BLD: 3.32 M/UL — LOW (ref 4.2–5.8)
RBC # FLD: 18.8 % — HIGH (ref 10.3–14.5)
SODIUM SERPL-SCNC: 143 MMOL/L — SIGNIFICANT CHANGE UP (ref 135–145)
WBC # BLD: 5.31 K/UL — SIGNIFICANT CHANGE UP (ref 3.8–10.5)
WBC # FLD AUTO: 5.31 K/UL — SIGNIFICANT CHANGE UP (ref 3.8–10.5)

## 2020-09-17 PROCEDURE — 99285 EMERGENCY DEPT VISIT HI MDM: CPT | Mod: 25

## 2020-09-17 PROCEDURE — 85025 COMPLETE CBC W/AUTO DIFF WBC: CPT

## 2020-09-17 PROCEDURE — P9016: CPT

## 2020-09-17 PROCEDURE — 80053 COMPREHEN METABOLIC PANEL: CPT

## 2020-09-17 PROCEDURE — 85610 PROTHROMBIN TIME: CPT

## 2020-09-17 PROCEDURE — 86901 BLOOD TYPING SEROLOGIC RH(D): CPT

## 2020-09-17 PROCEDURE — 86850 RBC ANTIBODY SCREEN: CPT

## 2020-09-17 PROCEDURE — 86923 COMPATIBILITY TEST ELECTRIC: CPT

## 2020-09-17 PROCEDURE — 86900 BLOOD TYPING SEROLOGIC ABO: CPT

## 2020-09-17 PROCEDURE — 85730 THROMBOPLASTIN TIME PARTIAL: CPT

## 2020-09-17 PROCEDURE — 36430 TRANSFUSION BLD/BLD COMPNT: CPT

## 2020-09-17 PROCEDURE — 36415 COLL VENOUS BLD VENIPUNCTURE: CPT

## 2022-05-23 ENCOUNTER — NON-APPOINTMENT (OUTPATIENT)
Age: 37
End: 2022-05-23

## 2022-06-01 ENCOUNTER — EMERGENCY (EMERGENCY)
Facility: HOSPITAL | Age: 37
LOS: 1 days | Discharge: HOME | End: 2022-06-01
Attending: EMERGENCY MEDICINE | Admitting: EMERGENCY MEDICINE
Payer: MEDICARE

## 2022-06-01 VITALS
DIASTOLIC BLOOD PRESSURE: 82 MMHG | HEIGHT: 70 IN | RESPIRATION RATE: 18 BRPM | HEART RATE: 104 BPM | OXYGEN SATURATION: 99 % | TEMPERATURE: 97 F | WEIGHT: 210.1 LBS | SYSTOLIC BLOOD PRESSURE: 120 MMHG

## 2022-06-01 VITALS
OXYGEN SATURATION: 99 % | SYSTOLIC BLOOD PRESSURE: 124 MMHG | HEART RATE: 78 BPM | TEMPERATURE: 98 F | DIASTOLIC BLOOD PRESSURE: 74 MMHG | RESPIRATION RATE: 18 BRPM

## 2022-06-01 PROCEDURE — 73130 X-RAY EXAM OF HAND: CPT | Mod: 26,RT

## 2022-06-01 PROCEDURE — 99284 EMERGENCY DEPT VISIT MOD MDM: CPT | Mod: 25

## 2022-06-01 PROCEDURE — 73110 X-RAY EXAM OF WRIST: CPT | Mod: 26,RT

## 2022-06-01 PROCEDURE — 73130 X-RAY EXAM OF HAND: CPT

## 2022-06-01 PROCEDURE — 73110 X-RAY EXAM OF WRIST: CPT

## 2022-06-01 PROCEDURE — 99284 EMERGENCY DEPT VISIT MOD MDM: CPT | Mod: FS

## 2022-06-01 PROCEDURE — 29125 APPL SHORT ARM SPLINT STATIC: CPT | Mod: RT

## 2022-06-01 RX ADMIN — Medication 1 APPLICATION(S): at 15:37

## 2022-06-01 NOTE — ED PROVIDER NOTE - PHYSICAL EXAMINATION
Constitutional: Awake, Alert, non-toxic. NAD. Well appearing, well nourished.   HEAD: Normocephalic, atraumatic.   EYES: EOM intact, conjunctiva and sclera are clear bilaterally.   ENT: No rhinorrhea, patent, mucous membranes pink/moist, no drooling or stridor.   NECK: Supple, non-tender  RESPIRATORY: Normal respiratory effort  EXTREMITIES: Full passive and active ROM in all extremities; (+) right hand 5th MC swelling and TTP, wrist non-tender to palpation; distal pulses palpable and symmetric  SKIN: Warm, dry; good skin turgor, (+) fungal rash noted to right 5th MCP, no ecchymosis, brisk capillary refill.  NEURO: A&O x3. Sensory and motor functions are grossly intact. Speech is normal. Appearance and judgement seem appropriate for gender and age.

## 2022-06-01 NOTE — ED PROVIDER NOTE - CARE PROVIDER_API CALL
Bola Hartley)  Orthopaedic Surgery; Surgery of the Hand  [address] [place] [address]  Phone: [phone]  Fax: [phone]  Follow Up Time: 1-3 Days

## 2022-06-01 NOTE — ED ADULT TRIAGE NOTE - CHIEF COMPLAINT QUOTE
Pt ambulatory to triage stating "I broke my hand a week and a half ago".   Pt states he was seen at [facility] and referred to ortho but admits he reinjured his hand last Saturday and wanted to come to ed for additional imaging.    Pt states he initially hurt his hand after punching a wall, and the second time he "fell off of his girlfriends back" while playing.   Denies additional injury/head injury/neck injury.  Fingers warm pink and mobile, ace wrap in place. BN

## 2022-06-01 NOTE — ED PROVIDER NOTE - CLINICAL SUMMARY MEDICAL DECISION MAKING FREE TEXT BOX
36 male states 1.5 weeks ago he injured his right hand, went to urgent care, had xrays and was told it was fractured, told to f/u with orthopedics, states he was unable to and he re-injured it again, f/u xrays, noted fungal infection between 4th and 5th web space, treat with topical anti-fungal, re-splint, f/u with outpatient hand specialist.

## 2022-06-01 NOTE — ED PROVIDER NOTE - NSFOLLOWUPINSTRUCTIONS_ED_ALL_ED_FT
1) Follow-up with Orthopedics/hand, See referred doctor. Call today/next business day for close, prompt follow-up.  2) Return to Emergency room for any worsening or persistent pain, weakness, numbness, fever, color change to extremity, or any other concerning symptoms.  3) Take ibuprofen 600 mg every  6 hours as needed.   4) You can consider discussing with your doctor if physical therapy or further imaging as an MRI may be beneficial.      Boxer Fracture    WHAT YOU NEED TO KNOW:    What is a boxer fracture? A boxer fracture is a break of a bone in your hand. It usually happens in the bone that connects your wrist to your little finger or ring finger.     What are the signs and symptoms of a boxer fracture?   •Pain and swelling around your knuckle and the back of your hand      •Little finger that is twisted inward      •Decreased ability to bend or extend your finger      How is a boxer fracture treated? Boxer fractures usually heal by 8 weeks after the injury. Treatment depends on the type of fracture and how severe it is.   •Your little finger and ring finger may be taped together if your fracture is mild. You will be able to move your fingers, which will help prevent stiffness.      •Your bone may need to be straightened and a splint or cast applied. These devices will support your fracture while it heals.       •Medicines may be given to decrease pain. Ask your healthcare provider how to take prescription pain medicine safely.      •Surgery may be needed if your fracture is severe. Wires, pins, screws, or plates are used to keep your bones in place while they heal.      How can I manage my symptoms?   •Apply ice on your injury for 15 to 20 minutes every hour for 24 hours or as directed. Use an ice pack, or put crushed ice in a plastic bag. Cover it with a towel. Ice helps prevent tissue damage and decreases swelling and pain.      •Elevate your hand above the level of your heart as often as you can. This will help decrease swelling and pain. Prop your hand on pillows or blankets to keep it elevated comfortably.       •Go to physical therapy if directed. A physical therapist teaches you exercises to help improve movement and strength, and to decrease pain.      When should I seek immediate care?   •You cannot bend or extend your finger.      •You have severe pain.      •You have numbness or tingling in your finger.      When should I call my doctor?   •You have a fever.      •Your open wound is red, swollen, or draining pus.      •You have trouble moving your finger.      •You have questions or concerns about your condition or care.      CARE AGREEMENT:    You have the right to help plan your care. Learn about your health condition and how it may be treated. Discuss treatment options with your healthcare providers to decide what care you want to receive. You always have the right to refuse treatment. 1) Follow-up with Orthopedics/hand, See referred doctor. Call today/next business day for close, prompt follow-up.  2) Return to Emergency room for any worsening or persistent pain, weakness, numbness, fever, color change to extremity, or any other concerning symptoms.  3) Take ibuprofen 600 mg every  6 hours as needed.   4) You can consider discussing with your doctor if physical therapy or further imaging as an MRI may be beneficial. 1) Follow-up with Orthopedics/hand, See referred doctor. Call today/next business day for close, prompt follow-up.  2) Return to Emergency room for any worsening or persistent pain, weakness, numbness, fever, color change to extremity, or any other concerning symptoms.  3) Take ibuprofen 600 mg every  6 hours as needed.   4) You can consider discussing with your doctor if physical therapy or further imaging as an MRI may be beneficial.        Hamate Fracture       A hamate fracture is a break in a small wrist bone called the hamate. The hamate is located below and between your last two fingers (pinky and ring finger). It is shaped like a triangle and has a hook-like extension. Hamate fractures are seen in people who play certain sports or use the tools needed for some professions.    There are two types of hamate fracture:  •Type 1. This type involves the hook part of the bone.      •Type 2. This type involves the triangle part of the bone.        What are the causes?    This condition may be caused by:•A sudden injury, such as:  •Falling on an outstretched arm with a clenched fist.      •A crushing injury of the hand.      •A hard, direct hit to the hand.        •Overuse and stress from playing a sport with a bat, club, or racket, or doing work that involves certain tools.        What increases the risk?    This condition is more likely to develop in people who:  •Play a sport with a bat, club, or racket. These sports include baseball, hockey, golf, and tennis.      •Work with handheld tools, such as a hammer.      •Have weak or thin bones (osteoporosis).      •Are older.        What are the signs or symptoms?    Symptoms of this condition include:  •Pain.      •A weak and painful .      •Swelling.      •Bruising.      •Numbness of the pinky and ring fingers.      •Inability to move the pinky and ring fingers.      If the condition is caused by a sudden injury, symptoms may develop right away. If the condition is caused by overuse or stress, symptoms may develop gradually.      How is this diagnosed?    This condition may be diagnosed based on:  •Your history of sports activity.      •Your history of injury.      •Physical examination of your wrist.      •MRI or CT scan.        How is this treated?    Treatment for this condition depends on how severe the injury is. Treatment may include:  •Wearing a cast or a splint to keep the wrist still (immobilized) while the bone heals.      •Icing the area to help with pain and swelling.      •Taking medicine to help with pain and swelling, such as NSAIDs.      •Having physical therapy and doing exercises to help improve wrist movement.      •Surgery. This may be needed to remove the hook part of the bone if it is broken, or to move a bone that has moved out of place into the right position.        Follow these instructions at home:    If you have a cast or splint:     • Do not put pressure on any part of it until it is fully hardened. This may take several hours.      • Do not stick anything inside it to scratch your skin. Doing that increases your risk of infection.      •Keep it clean and dry.    •If it is not waterproof:  •Do not let it get wet.      •Cover it with a watertight covering when you take a bath or shower.        •Check the skin around it every day. Tell your health care provider about any concerns.      •You may put lotion on dry skin around the edges of the cast or splint. Do not put lotion on the skin underneath the cast.      If you have a removable splint:     •Wear it as told by your health care provider. Remove it only as told by your health care provider.      •Loosen it if your fingers tingle, become numb, or turn cold and blue.        Managing pain, stiffness, and swelling    •If directed, put ice on the injured area.  •If you have a removable splint, remove it as told by your health care provider.      •Put ice in a plastic bag.      •Place a towel between your skin and the bag or between your cast and the bag.      •Leave the ice on for 20 minutes, 2–3 times a day.        •Move your fingers often to reduce stiffness and swelling.      •Raise (elevate) the injured area above the level of your heart while you are sitting or lying down.      Driving     •Ask your health care provider if the medicine prescribed to you requires you to avoid driving or using heavy machinery.      •Ask your health care provider when it is safe to drive if you have a cast or splint on your hand or wrist.      Activity     • Do not lift or carry anything with your injured arm until your health care provider approves.      •Return to your normal activities as told by your health care provider. Ask your health care provider what activities are safe for you.      •Do exercises as told by your health care provider or physical therapist.      General instructions     •Take over-the-counter and prescription medicines only as told by your health care provider.      • Do not use any products that contain nicotine or tobacco, such as cigarettes, e-cigarettes, and chewing tobacco. These can delay bone healing. If you need help quitting, ask your health care provider.      •Keep all follow-up visits as told by your health care provider. This is important.        How is this prevented?    •Wear proper equipment, such as gloves, braces, or wrist guards, when playing sports.      •Work safely and move your body in ways that reduce your risk of injury.      •Tell your health care provider if you have hand pain or weakness when you swing a golf club or play a sport that uses a bat or racket. These may be early symptoms of this condition. Early diagnosis offers the best chance for a quick recovery without surgery.        Contact a health care provider if:    •Your pain or swelling gets worse.      •You have a cold feeling in your hand or fingers.      •Your cast or splint becomes loose or damaged.        Get help right away if:    •You lose feeling in your hand or fingers.      •Your fingers or fingernails turn blue or gray.        Summary    •A hamate fracture is a break in a small wrist bone called the hamate. The hamate is located below and between your last two fingers (pinky and ring finger).      •The condition is diagnosed with your medical history, your sports and work history, a physical examination, and an MRI or a CT scan.      •Treatment may include wearing a cast or a splint, icing the area to help with pain and swelling, taking medicines, doing physical therapy and exercises, and having surgery if needed.      This information is not intended to replace advice given to you by your health care provider. Make sure you discuss any questions you have with your health care provider.

## 2022-06-01 NOTE — ED PROVIDER NOTE - CARE PLAN
Principal Discharge DX:	Boxers fracture   1 Principal Discharge DX:	Boxers fracture  Secondary Diagnosis:	Fungal infection of skin   Principal Discharge DX:	Fracture, hamate  Secondary Diagnosis:	Fungal infection of skin

## 2022-06-01 NOTE — ED ADULT NURSE NOTE - CHIEF COMPLAINT QUOTE
Pt ambulatory to triage stating "I broke my hand a week and a half ago".   Pt states he was seen at [facility] and referred to ortho but admits he reinjured his hand last Saturday and wanted to come to ed for additional imaging.    Pt states he initially hurt his hand after punching a wall, and the second time he "fell off of his girlfriends back" while playing.   Denies additional injury/head injury/neck injury.  Fingers warm pink and mobile, ace wrap in place. BN

## 2022-06-01 NOTE — ED ADULT NURSE NOTE - OBJECTIVE STATEMENT
Pt reports hand injury 1.5 weeks ago, fx noted by urgent care, reinjury a few days ago, attempted to f/u with ortho but couldn't get appt. Pt ambulatory, alert and oriented, well appearing.

## 2022-06-01 NOTE — ED PROVIDER NOTE - NS ED ATTENDING STATEMENT MOD
This was a shared visit with the [facility]. I reviewed and verified the documentation and independently performed the documented:

## 2022-06-01 NOTE — ED PROVIDER NOTE - PROGRESS NOTE DETAILS
Dr. Chin advised ulnar gutter splint. All questions were answered. Discussed the importance of prompt, close medical follow-up. Patient will return with any changes, concerns or persistent/worsening symptoms.  Patient verbalized understanding.

## 2022-06-01 NOTE — ED PROVIDER NOTE - PATIENT PORTAL LINK FT
You can access the FollowMyHealth Patient Portal offered by [facility] by registering at the following website: [email or web address]. By joining [employer]’s FollowMyHealth portal, you will also be able to view your health information using other applications (apps) compatible with our system.

## 2022-06-01 NOTE — ED PROVIDER NOTE - OBJECTIVE STATEMENT
35 y/o male with PMHx Schizophrenia presents today due to right hand injury. pt reports he punched and wall and sustained fx 1.5 weeks ago. pt went to  in which splint was applied. pt reports he reinjured it after falling off his girlfriends back. pt describes pain as aching, non-radiating, and currently 4/10. pt reports he attempted to follow up with orthopedic referred by  but unsuccessful. pt denies head injury, LOC, laceration, numbness/weakness, or any other complaints.

## 2022-06-01 NOTE — ED ADULT NURSE NOTE - NSIMPLEMENTINTERV_GEN_ALL_ED
Implemented All Universal Safety Interventions:  [place] to call system. Call bell, personal items and telephone within reach. Instruct patient to call for assistance. Room bathroom lighting operational. Non-slip footwear when patient is off stretcher. Physically safe environment: no spills, clutter or unnecessary equipment. Stretcher in lowest position, wheels locked, appropriate side rails in place.

## 2022-10-08 ENCOUNTER — EMERGENCY (EMERGENCY)
Facility: HOSPITAL | Age: 37
LOS: 1 days | Discharge: HOME | End: 2022-10-08
Attending: EMERGENCY MEDICINE | Admitting: EMERGENCY MEDICINE
Payer: MEDICARE

## 2022-10-08 ENCOUNTER — EMERGENCY (EMERGENCY)
Facility: HOSPITAL | Age: 37
LOS: 1 days | End: 2022-10-08
Payer: MEDICARE

## 2022-10-08 VITALS
HEART RATE: 84 BPM | RESPIRATION RATE: 15 BRPM | SYSTOLIC BLOOD PRESSURE: 134 MMHG | OXYGEN SATURATION: 99 % | DIASTOLIC BLOOD PRESSURE: 65 MMHG | TEMPERATURE: 98 F

## 2022-10-08 VITALS
HEIGHT: 70 IN | WEIGHT: 210.1 LBS | DIASTOLIC BLOOD PRESSURE: 73 MMHG | RESPIRATION RATE: 18 BRPM | TEMPERATURE: 98 F | HEART RATE: 93 BPM | OXYGEN SATURATION: 95 % | SYSTOLIC BLOOD PRESSURE: 119 MMHG

## 2022-10-08 VITALS
TEMPERATURE: 98 F | HEART RATE: 88 BPM | HEIGHT: 70 IN | DIASTOLIC BLOOD PRESSURE: 68 MMHG | OXYGEN SATURATION: 97 % | RESPIRATION RATE: 16 BRPM | SYSTOLIC BLOOD PRESSURE: 120 MMHG | WEIGHT: 207.01 LBS

## 2022-10-08 LAB
ALBUMIN SERPL ELPH-MCNC: 3.8 G/DL — SIGNIFICANT CHANGE UP (ref 3.3–5)
ALP SERPL-CCNC: 72 U/L — SIGNIFICANT CHANGE UP (ref 30–120)
ALT FLD-CCNC: 27 U/L DA — SIGNIFICANT CHANGE UP (ref 10–60)
ANION GAP SERPL CALC-SCNC: 4 MMOL/L — LOW (ref 5–17)
APTT BLD: 26.3 SEC — LOW (ref 27.5–35.5)
AST SERPL-CCNC: 20 U/L — SIGNIFICANT CHANGE UP (ref 10–40)
BASOPHILS # BLD AUTO: 0.03 K/UL — SIGNIFICANT CHANGE UP (ref 0–0.2)
BASOPHILS NFR BLD AUTO: 0.2 % — SIGNIFICANT CHANGE UP (ref 0–2)
BILIRUB SERPL-MCNC: 0.5 MG/DL — SIGNIFICANT CHANGE UP (ref 0.2–1.2)
BUN SERPL-MCNC: 14 MG/DL — SIGNIFICANT CHANGE UP (ref 7–23)
CALCIUM SERPL-MCNC: 9.1 MG/DL — SIGNIFICANT CHANGE UP (ref 8.4–10.5)
CHLORIDE SERPL-SCNC: 102 MMOL/L — SIGNIFICANT CHANGE UP (ref 96–108)
CO2 SERPL-SCNC: 30 MMOL/L — SIGNIFICANT CHANGE UP (ref 22–31)
CREAT SERPL-MCNC: 0.98 MG/DL — SIGNIFICANT CHANGE UP (ref 0.5–1.3)
EGFR: 101 ML/MIN/1.73M2 — SIGNIFICANT CHANGE UP
EOSINOPHIL # BLD AUTO: 0.1 K/UL — SIGNIFICANT CHANGE UP (ref 0–0.5)
EOSINOPHIL NFR BLD AUTO: 0.8 % — SIGNIFICANT CHANGE UP (ref 0–6)
GLUCOSE SERPL-MCNC: 86 MG/DL — SIGNIFICANT CHANGE UP (ref 70–99)
HCT VFR BLD CALC: 45.8 % — SIGNIFICANT CHANGE UP (ref 39–50)
HGB BLD-MCNC: 16.1 G/DL — SIGNIFICANT CHANGE UP (ref 13–17)
IMM GRANULOCYTES NFR BLD AUTO: 0.3 % — SIGNIFICANT CHANGE UP (ref 0–0.9)
INR BLD: 1.1 RATIO — SIGNIFICANT CHANGE UP (ref 0.88–1.16)
LIDOCAIN IGE QN: 64 U/L — LOW (ref 73–393)
LYMPHOCYTES # BLD AUTO: 1.44 K/UL — SIGNIFICANT CHANGE UP (ref 1–3.3)
LYMPHOCYTES # BLD AUTO: 11.9 % — LOW (ref 13–44)
MCHC RBC-ENTMCNC: 32.7 PG — SIGNIFICANT CHANGE UP (ref 27–34)
MCHC RBC-ENTMCNC: 35.2 GM/DL — SIGNIFICANT CHANGE UP (ref 32–36)
MCV RBC AUTO: 92.9 FL — SIGNIFICANT CHANGE UP (ref 80–100)
MONOCYTES # BLD AUTO: 1.08 K/UL — HIGH (ref 0–0.9)
MONOCYTES NFR BLD AUTO: 8.9 % — SIGNIFICANT CHANGE UP (ref 2–14)
NEUTROPHILS # BLD AUTO: 9.43 K/UL — HIGH (ref 1.8–7.4)
NEUTROPHILS NFR BLD AUTO: 77.9 % — HIGH (ref 43–77)
NRBC # BLD: 0 /100 WBCS — SIGNIFICANT CHANGE UP (ref 0–0)
PLATELET # BLD AUTO: 216 K/UL — SIGNIFICANT CHANGE UP (ref 150–400)
POTASSIUM SERPL-MCNC: 3.8 MMOL/L — SIGNIFICANT CHANGE UP (ref 3.5–5.3)
POTASSIUM SERPL-SCNC: 3.8 MMOL/L — SIGNIFICANT CHANGE UP (ref 3.5–5.3)
PROT SERPL-MCNC: 7 G/DL — SIGNIFICANT CHANGE UP (ref 6–8.3)
PROTHROM AB SERPL-ACNC: 12.7 SEC — SIGNIFICANT CHANGE UP (ref 10.5–13.4)
RBC # BLD: 4.93 M/UL — SIGNIFICANT CHANGE UP (ref 4.2–5.8)
RBC # FLD: 12.3 % — SIGNIFICANT CHANGE UP (ref 10.3–14.5)
SODIUM SERPL-SCNC: 136 MMOL/L — SIGNIFICANT CHANGE UP (ref 135–145)
TROPONIN I, HIGH SENSITIVITY RESULT: 4.6 NG/L — SIGNIFICANT CHANGE UP
WBC # BLD: 12.12 K/UL — HIGH (ref 3.8–10.5)
WBC # FLD AUTO: 12.12 K/UL — HIGH (ref 3.8–10.5)

## 2022-10-08 PROCEDURE — 85025 COMPLETE CBC W/AUTO DIFF WBC: CPT

## 2022-10-08 PROCEDURE — 73030 X-RAY EXAM OF SHOULDER: CPT

## 2022-10-08 PROCEDURE — 99285 EMERGENCY DEPT VISIT HI MDM: CPT | Mod: FS

## 2022-10-08 PROCEDURE — 71260 CT THORAX DX C+: CPT | Mod: 26,MA

## 2022-10-08 PROCEDURE — 70450 CT HEAD/BRAIN W/O DYE: CPT | Mod: 26,MA

## 2022-10-08 PROCEDURE — 80053 COMPREHEN METABOLIC PANEL: CPT

## 2022-10-08 PROCEDURE — 74177 CT ABD & PELVIS W/CONTRAST: CPT | Mod: 26,MA

## 2022-10-08 PROCEDURE — 86901 BLOOD TYPING SEROLOGIC RH(D): CPT

## 2022-10-08 PROCEDURE — 99284 EMERGENCY DEPT VISIT MOD MDM: CPT | Mod: 25

## 2022-10-08 PROCEDURE — 36415 COLL VENOUS BLD VENIPUNCTURE: CPT

## 2022-10-08 PROCEDURE — 83690 ASSAY OF LIPASE: CPT

## 2022-10-08 PROCEDURE — L9991: CPT

## 2022-10-08 PROCEDURE — 70450 CT HEAD/BRAIN W/O DYE: CPT | Mod: MA

## 2022-10-08 PROCEDURE — 74177 CT ABD & PELVIS W/CONTRAST: CPT | Mod: MA

## 2022-10-08 PROCEDURE — 85610 PROTHROMBIN TIME: CPT

## 2022-10-08 PROCEDURE — 84484 ASSAY OF TROPONIN QUANT: CPT

## 2022-10-08 PROCEDURE — 72125 CT NECK SPINE W/O DYE: CPT | Mod: MA

## 2022-10-08 PROCEDURE — 71260 CT THORAX DX C+: CPT | Mod: MA

## 2022-10-08 PROCEDURE — 99285 EMERGENCY DEPT VISIT HI MDM: CPT | Mod: 25

## 2022-10-08 PROCEDURE — 85730 THROMBOPLASTIN TIME PARTIAL: CPT

## 2022-10-08 PROCEDURE — 86850 RBC ANTIBODY SCREEN: CPT

## 2022-10-08 PROCEDURE — 73030 X-RAY EXAM OF SHOULDER: CPT | Mod: 26,50

## 2022-10-08 PROCEDURE — 72125 CT NECK SPINE W/O DYE: CPT | Mod: 26,MA

## 2022-10-08 PROCEDURE — 86900 BLOOD TYPING SEROLOGIC ABO: CPT

## 2022-10-08 NOTE — ED ADULT TRIAGE NOTE - CHIEF COMPLAINT QUOTE
" I fell while doing  a back flip yesterday, landed hard on my left shoulder, I have a lot of pain both shoulders and chest area, I passed out this am "

## 2022-10-08 NOTE — ED ADULT TRIAGE NOTE - CHIEF COMPLAINT QUOTE
Adrianne will come in for a visit today. She continues to have vaginal bleeding. Should she remain in Hospice? Will evaluate at the appointment.
injury to chest and shoulders after doing a backflip last night. Unable to lift right arm. Patient also states when he went to the bathroom this AM he passed out.

## 2022-10-08 NOTE — ED PROVIDER NOTE - CARE PLAN
1 Principal Discharge DX:	Fall  Secondary Diagnosis:	Head injury  Secondary Diagnosis:	Syncope  Secondary Diagnosis:	Shoulder pain

## 2022-10-08 NOTE — ED PROVIDER NOTE - NSFOLLOWUPINSTRUCTIONS_ED_ALL_ED_FT
1) Follow-up with Orthopedics, See referred doctor. Call today/next business day for close, prompt follow-up.  2) Return to Emergency room for any worsening or persistent pain, weakness, numbness, fever, color change to extremity, or any other concerning symptoms.  3) Take ibuprofen 600 mg every  6 hours as needed.   4) You can consider discussing with your doctor if physical therapy or further imaging as an MRI may be beneficial.     Shoulder Pain      Many things can cause shoulder pain, including:  •An injury.      •Moving the shoulder in the same way again and again (overuse).      •Joint pain (arthritis).      Pain can come from:  •Swelling and irritation (inflammation) of any part of the shoulder.      •An injury to the shoulder joint.    •An injury to:  •Tissues that connect muscle to bone (tendons).      •Tissues that connect bones to each other (ligaments).      •Bones.          Follow these instructions at home:    Watch for changes in your symptoms. Let your doctor know about them. Follow these instructions to help with your pain.    If you have a sling:     •Wear the sling as told by your doctor. Remove it only as told by your doctor.    •Loosen the sling if your fingers:  •Tingle.       •Become numb.       •Turn cold and blue.         •Keep the sling clean.    •If the sling is not waterproof:  •Do not let it get wet.      •Take the sling off when you shower or bathe.          Managing pain, stiffness, and swelling   Bag of ice on a towel on the skin. •If told, put ice on the painful area:  • Put ice in a plastic bag.       •Place a towel between your skin and the bag.       •Leave the ice on for 20 minutes, 2–3 times a day. Stop putting ice on if it does not help with the pain.        •Squeeze a soft ball or a foam pad as much as possible. This prevents swelling in the shoulder. It also helps to strengthen the arm.      General instructions     •Take over-the-counter and prescription medicines only as told by your doctor.      •Keep all follow-up visits as told by your doctor. This is important.        Contact a doctor if:    •Your pain gets worse.      •Medicine does not help your pain.      •You have new pain in your arm, hand, or fingers.        Get help right away if:  •Your arm, hand, or fingers:  •Tingle.      •Are numb.      •Are swollen.      •Are painful.      •Turn white or blue.          Summary    •Shoulder pain can be caused by many things. These include injury, moving the shoulder in the same away again and again, and joint pain.      •Watch for changes in your symptoms. Let your doctor know about them.      •This condition may be treated with a sling, ice, and pain medicine.      •Contact your doctor if the pain gets worse or you have new pain. Get help right away if your arm, hand, or fingers tingle or get numb, swollen, or painful.      •Keep all follow-up visits as told by your doctor. This is important.      This information is not intended to replace advice given to you by your health care provider. Make sure you discuss any questions you have with your health care provider.

## 2022-10-08 NOTE — ED PROVIDER NOTE - PROGRESS NOTE DETAILS
Reevaluated patient at bedside. Patient notes improvement of symptoms. Discussed results with the patient and provided copies. Advised of thryoid nodule. Advised of preliminary results, requesting dc. All questions were answered. Discussed the importance of prompt, close medical follow-up. Patient will return with any changes, concerns or persistent/worsening symptoms.  Patient verbalized understanding.

## 2022-10-08 NOTE — ED PROVIDER NOTE - OBJECTIVE STATEMENT
38 y/o male with PMHx Schizophrenia presents today due to fall. pt reports he was trying to do a back flip jumping off 2 foot object at the park in which he landed on left side of body. pt admits to hitting his left side of head. pt reports having pain to left shoulder and chest wall, describes as aching, non-radiating, and currently 8/10. pt reports this morning he woke up to urinate in which he was in so much pain he passed out. pt denies blood thinner use, vomiting, numbness/weakness, headache, LOC, hemoptysis, abd pain, laceration, or any other complaints.

## 2022-10-08 NOTE — ED PROVIDER NOTE - CLINICAL SUMMARY MEDICAL DECISION MAKING FREE TEXT BOX
Patient fell last night onto left side. Passed out from the pain today. Pain in shoulders and upper chest. Will get imaging and labs, and reassess when results available

## 2022-10-08 NOTE — ED PROVIDER NOTE - ATTENDING APP SHARED VISIT CONTRIBUTION OF CARE
Remberto Black MD: I have personally performed a face to face diagnostic evaluation on this patient.  I have reviewed the PA note and agree with the history, exam, and plan of care, except as noted.  History and Exam by me shows same findings as documented

## 2022-10-08 NOTE — ED PROVIDER NOTE - PATIENT PORTAL LINK FT
You can access the FollowMyHealth Patient Portal offered by [facility] by registering at the following website: [email or web address]. By joining [employer]’s FollowMyHealth portal, you will also be able to view your health information using other applications (apps) compatible with our system.

## 2022-10-08 NOTE — ED PROVIDER NOTE - PHYSICAL EXAMINATION
Constitutional: Awake, Alert, non-toxic. NAD  HEAD: Normocephalic, atraumatic.   EYES: PERRL, EOM intact, conjunctiva and sclera are clear bilaterally. No raccoon eyes.   ENT: No keyes sign. No rhinorrhea, normal pharynx, patent, no tonsillar exudate or enlargement, mucous membranes pink/moist, no erythema, no drooling or stridor.   NECK: Supple, non-tender  BACK: No midline or paraspinal TTP of cervical/thoracic/lumbar spine, FROM. No ecchymosis or hematomas.   CARDIOVASCULAR: Normal S1, S2; regular rate and rhythm.  RESPIRATORY: Normal respiratory effort; breath sounds CTAB, no wheezes, rhonchi, or rales. Speaking in full sentences. No accessory muscle use.   ABDOMEN: Soft; non-tender, non-distended  EXTREMITIES: Full passive and active ROM in all extremities; non-tender to palpation; distal pulses palpable and symmetric, no edema, no crepitus or step off  SKIN: Warm, dry; good skin turgor, no apparent lesions or rashes, no ecchymosis, brisk capillary refill.  NEURO: A&O x3. Sensory and motor functions are grossly intact. Speech is normal. Appearance and judgement seem appropriate for gender and age. No neurological deficits. Neurovascular sensation intact motor function 5/5 of upper and lower extremities, CN II-XII grossly intact, no ataxia, absent pronator drift, intact cerebellar function. Speech clear, without articulation or word-finding difficulties. Eyes- PERRL bilaterally. EOMs in tact. No nystagmus. No facial droop.

## 2022-10-08 NOTE — ED PROVIDER NOTE - CARE PROVIDER_API CALL
Marcell Mohan)  Orthopedics  [address], [address]  [place] [address]  Phone: [phone]  Fax: [phone]  Follow Up Time: 1-3 Days

## 2022-10-09 ENCOUNTER — TRANSCRIPTION ENCOUNTER (OUTPATIENT)
Age: 37
End: 2022-10-09

## 2023-07-09 NOTE — ED BEHAVIORAL HEALTH ASSESSMENT NOTE - SUICIDE RISK FACTORS
Command hallucinations/Unable to engage in safety planning/Recent onset of current/past psychiatric diagnosis/Psychotic disorder current/past/Alcohol/Substance abuse disorders Neuro

## 2023-10-19 NOTE — PATIENT PROFILE BEHAVIORAL HEALTH - NSBHPSYHX_PSY_A_CORE
----- Message from Kasi Buitrago, DO sent at 10/18/2023  8:07 PM EDT -----  Please let pt/ know that xrays show know fxr  Con't with plan from office  Let me know if questions, thanks! self-harm

## 2024-12-17 NOTE — ED ADULT NURSE NOTE - NS ED NURSE DC INFO COMPLEXITY
Post-Op Assessment Note    CV Status:  Stable  Pain Score: 0    Pain management: adequate       Mental Status:  Arousable   Hydration Status:  Stable   PONV Controlled:  None   Airway Patency:  Patent     Post Op Vitals Reviewed: Yes    No anethesia notable event occurred.    Staff: CRNA           Last Filed PACU Vitals:  Vitals Value Taken Time   Temp 97.3    Pulse 88    /85    Resp 16    SpO2 91 on RA        Modified [place]:  No data recorded       Simple: Patient demonstrates quick and easy understanding
